# Patient Record
Sex: MALE | Race: WHITE | HISPANIC OR LATINO | Employment: FULL TIME | URBAN - METROPOLITAN AREA
[De-identification: names, ages, dates, MRNs, and addresses within clinical notes are randomized per-mention and may not be internally consistent; named-entity substitution may affect disease eponyms.]

---

## 2017-03-23 VITALS
HEIGHT: 70 IN | TEMPERATURE: 98.7 F | RESPIRATION RATE: 16 BRPM | SYSTOLIC BLOOD PRESSURE: 120 MMHG | WEIGHT: 210 LBS | HEART RATE: 82 BPM | BODY MASS INDEX: 30.06 KG/M2 | DIASTOLIC BLOOD PRESSURE: 72 MMHG

## 2017-03-23 RX ORDER — BUDESONIDE AND FORMOTEROL FUMARATE DIHYDRATE 80; 4.5 UG/1; UG/1
2 AEROSOL RESPIRATORY (INHALATION) 2 TIMES DAILY
COMMUNITY
End: 2017-05-26

## 2017-03-23 RX ORDER — ALBUTEROL SULFATE 90 UG/1
2 AEROSOL, METERED RESPIRATORY (INHALATION) EVERY 4 HOURS PRN
COMMUNITY
End: 2022-09-17

## 2017-03-23 RX ORDER — MONTELUKAST SODIUM 10 MG/1
10 TABLET ORAL DAILY
COMMUNITY
End: 2017-04-17 | Stop reason: SDUPTHER

## 2017-03-24 ENCOUNTER — OFFICE VISIT (OUTPATIENT)
Dept: PULMONOLOGY | Facility: HOSPICE | Age: 62
End: 2017-03-24
Payer: COMMERCIAL

## 2017-03-24 VITALS
DIASTOLIC BLOOD PRESSURE: 80 MMHG | RESPIRATION RATE: 16 BRPM | BODY MASS INDEX: 29.84 KG/M2 | WEIGHT: 208 LBS | TEMPERATURE: 97.3 F | SYSTOLIC BLOOD PRESSURE: 132 MMHG | HEART RATE: 76 BPM | OXYGEN SATURATION: 95 %

## 2017-03-24 DIAGNOSIS — R05.9 COUGH: ICD-10-CM

## 2017-03-24 DIAGNOSIS — J45.40 MODERATE PERSISTENT ASTHMA WITHOUT COMPLICATION: ICD-10-CM

## 2017-03-24 DIAGNOSIS — J30.2 SEASONAL ALLERGIC RHINITIS, UNSPECIFIED ALLERGIC RHINITIS TRIGGER: ICD-10-CM

## 2017-03-24 DIAGNOSIS — R13.10 DYSPHAGIA, UNSPECIFIED TYPE: ICD-10-CM

## 2017-03-24 PROCEDURE — 99214 OFFICE O/P EST MOD 30 MIN: CPT | Mod: 25 | Performed by: NURSE PRACTITIONER

## 2017-03-24 PROCEDURE — 94664 DEMO&/EVAL PT USE INHALER: CPT | Performed by: NURSE PRACTITIONER

## 2017-03-24 RX ORDER — GUAIFENESIN AND CODEINE PHOSPHATE 100; 10 MG/5ML; MG/5ML
SYRUP ORAL
COMMUNITY
Start: 2017-03-01 | End: 2017-03-03

## 2017-03-24 RX ORDER — GUAIFENESIN 600 MG/1
600 TABLET, EXTENDED RELEASE ORAL EVERY 12 HOURS
COMMUNITY
End: 2022-09-17

## 2017-03-24 RX ORDER — OMEPRAZOLE 20 MG/1
20 CAPSULE, DELAYED RELEASE ORAL
Qty: 30 CAP | Refills: 1 | Status: SHIPPED | OUTPATIENT
Start: 2017-03-24 | End: 2017-04-18 | Stop reason: SDUPTHER

## 2017-03-24 RX ORDER — PREDNISONE 10 MG/1
TABLET ORAL
Qty: 18 TAB | Refills: 0 | Status: SHIPPED | OUTPATIENT
Start: 2017-03-24 | End: 2019-03-05

## 2017-03-24 RX ORDER — ALBUTEROL SULFATE 2.5 MG/3ML
2.5 SOLUTION RESPIRATORY (INHALATION) EVERY 4 HOURS PRN
Qty: 180 ML | Refills: 1 | Status: SHIPPED | OUTPATIENT
Start: 2017-03-24 | End: 2018-07-02 | Stop reason: SDUPTHER

## 2017-03-24 RX ORDER — AZITHROMYCIN 250 MG/1
TABLET, FILM COATED ORAL
Qty: 6 TAB | Refills: 0 | Status: SHIPPED | OUTPATIENT
Start: 2017-03-24 | End: 2019-03-05

## 2017-03-24 RX ORDER — OSELTAMIVIR PHOSPHATE 75 MG/1
CAPSULE ORAL
COMMUNITY
Start: 2017-03-01 | End: 2017-03-30

## 2017-03-24 RX ORDER — ESZOPICLONE 3 MG/1
TABLET, FILM COATED ORAL
Refills: 5 | COMMUNITY
Start: 2017-02-28 | End: 2017-03-01

## 2017-03-24 NOTE — PATIENT INSTRUCTIONS
1. DC Qvar for now. Increase to Dulera 200/5, 2 puffs, twice a day, rinse mouth after use. Sample provided.  2. Rx for albuterol to pharmacy and home nebulizer to [facility]. Use albuterol via nebulizer every 4-6 hours, as needed, for cough or wheeze.  3. Rx for prednisone and Z-Natalio to pharmacy  4. Rx for omeprazole sent to pharmacy  5. Referral to GI  6. Follow-up in 2 months, sooner if needed

## 2017-03-24 NOTE — PROGRESS NOTES
Chief Complaint   Patient presents with   • Cough     x3 weeks, has tried OTC medications with no relief         HPI: This patient is a 61 y.o. male, who presents for annual follow-up of asthma/COPD. former 20 pack year smoking history quit in 1988. PFTs indicate an FEV1 of 2.81 L 76% predicted, FEV1 FVC ratio of 75, no significant bronchodilator response. He is compliant with Qvar 40 µg 2 puffs, twice a day, Singulair daily, albuterol HFA as needed. Over the past 3 weeks he's had worsening dyspnea, wheeze and productive cough. He tells me he always has a cough however this is again worse over the last 3 weeks. He was seen at urgent care in [place] treated with albuterol nebulizer and given Tamiflu. Cough is not resolved. He is requesting antibiotic today. Prior to becoming ill, his cough typically occurred while eating. He tells me he has difficulty swallowing. He will start to cough so violently at times that he vomits his meal. He has a history of reflux and was on omeprazole many years ago but discontinued this. He will experience occasional postnasal drip. Denies fever, chills, night sweats.    Past Medical History   Diagnosis Date   • Asthma        Social History   Substance Use Topics   • Smoking status: Former Smoker -- 1.00 packs/day for 20 years     Types: Cigarettes     Quit date: 01/01/1988   • Smokeless tobacco: None      Comment: Years unknown-please verify   • Alcohol Use: 0.6 - 1.8 oz/week     1-3 Standard drinks or equivalent per week      Comment: Hard liquor       Family History   Problem Relation Age of Onset   • Lung Cancer Father        Current medications as of today   Current Outpatient Prescriptions   Medication Sig Dispense Refill   • oseltamivir (TAMIFLU) 75 MG Cap      • guaifenesin LA (MUCINEX) 600 MG TABLET SR 12 HR Take 600 mg by mouth every 12 hours.     • beclomethasone (QVAR) 40 MCG/ACT inhaler Inhale 1 Puff by mouth 2 Times a Day.     • azithromycin (ZITHROMAX) 250 MG Tab Take 2  tablets on day 1, then take 1 tablet a day for 4 days. 6 Tab 0   • predniSONE (DELTASONE) 10 MG Tab Take 30mg x 3 days, then take 20mg x 3 days, then take 10mg x 3 days, with food, then discontinue. 18 Tab 0   • omeprazole (PRILOSEC) 20 MG delayed-release capsule Take 1 Cap by mouth every morning before breakfast. 30 minutes before meal. 30 Cap 1   • albuterol (PROVENTIL) 2.5mg/3ml Nebu Soln solution for nebulization 3 mL by Nebulization route every four hours as needed for Shortness of Breath. 180 mL 1   • montelukast (SINGULAIR) 10 MG Tab Take 10 mg by mouth every day.     • albuterol (PROAIR HFA) 108 (90 BASE) MCG/ACT Aero Soln inhalation aerosol Inhale 2 Puffs by mouth every four hours as needed for Shortness of Breath.     • budesonide-formoterol (SYMBICORT) 80-4.5 MCG/ACT Aerosol Inhale 2 Puffs by mouth 2 Times a Day. Rinse mouth after each use.       No current facility-administered medications for this visit.       Allergies: Review of patient's allergies indicates no known allergies.    Blood pressure 132/80, pulse 76, temperature 36.3 °C (97.3 °F), resp. rate 16, weight 94.348 kg (208 lb), SpO2 95 %.      ROS:   Constitutional: Denies fevers, chills, night sweats, weight loss or fatigue  HEENT: Denies earache, difficulty hearing, tinnitus, nasal congestion, hoarseness  Cardiovascular: Denies chest pain, tightness, palpitations, orthopnea or edema  Respiratory: See HPI  Sleep: Denies daytime sleepiness, snoring, apneas, insomnia, morning headaches  GI: Denies, nausea, abdominal pain, diarrhea or constipation. Positive for heartburn and dysphagia  : Denies frequent urination, hematuria, discharge or painful urination  Musculoskeletal: Denies back pain, painful joints, sore muscles  Neurological: Denies weakness or headaches  Skin: No rashes    Physical exam:   Appearance: Well-nourished, well-developed, in no acute distress  HEENT: Normocephalic, atraumatic, white sclera, PERRLA, oropharynx clear,  Mallampati 2  Respiratory: no intercostal retractions or accessory muscle use   Lungs auscultation: Clear to auscultation bilaterally  Cardiovascular: Regular rate rhythm no murmurs, rubs or gallops  Gait: Normal  Digits: No clubbing, cyanosis  Motor: No focal deficits  Orientation: Oriented to time, person and place    Diagnosis:  1. Moderate persistent asthma without complication  azithromycin (ZITHROMAX) 250 MG Tab    predniSONE (DELTASONE) 10 MG Tab    albuterol (PROVENTIL) 2.5mg/3ml Nebu Soln solution for nebulization    DME NEBULIZER   2. Cough  azithromycin (ZITHROMAX) 250 MG Tab    predniSONE (DELTASONE) 10 MG Tab    omeprazole (PRILOSEC) 20 MG delayed-release capsule    albuterol (PROVENTIL) 2.5mg/3ml Nebu Soln solution for nebulization    DME NEBULIZER    REFERRAL TO GASTROENTEROLOGY   3. Seasonal allergic rhinitis, unspecified allergic rhinitis trigger     4. Dysphagia, unspecified type  REFERRAL TO GASTROENTEROLOGY       Plan:  I will treat bronchitis and asthma exacerbation with prednisone, antibiotic and increased bronchodilators. Patient always has a dry cough which he describes being worse after eating. I will restart him on omeprazole as he does experience periodic GERD symptoms. Follow up with GI for possible endoscopy. Pending their workup, consider update chest CT and PFTs.    1. DC Qvar for now. Increase to Dulera 200/5, 2 puffs, twice a day, rinse mouth after use. Sample provided.  2. Rx for albuterol to pharmacy and home nebulizer to [facility]. Use albuterol via nebulizer every 4-6 hours, as needed, for cough or wheeze.  3. Rx for prednisone and Z-Natalio to pharmacy  4. Rx for omeprazole sent to pharmacy  5. Referral to GI for dysphagia and cough after eating  6. Follow-up in 2 months, sooner if needed

## 2017-03-24 NOTE — MR AVS SNAPSHOT
Stew Ramos   3/24/2017 1:00 PM   Office Visit   MRN: [id number]    Department:  [facility]   Dept Phone:  [phone]    Description:  Male : 1955   Provider:  USMAN Riley           Reason for Visit     Cough x3 weeks, has tried OTC medications with no relief      Allergies as of 3/24/2017     No Known Allergies      You were diagnosed with     Moderate persistent asthma without complication   [791180]       Cough   [786.2.ICD-9-CM]       Seasonal allergic rhinitis, unspecified allergic rhinitis trigger   [4924426]       Dysphagia, unspecified type   [3561521]         Vital Signs     Blood Pressure Pulse Temperature Respirations Weight Oxygen Saturation    132/80 mmHg 76 36.3 °C (97.3 °F) 16 94.348 kg (208 lb) 95%    Smoking Status                   Former Smoker           Basic Information     Date Of Birth Sex Race Ethnicity Preferred Language    1955 Male Unable to Obtain Unknown English      Your appointments     May 26, 2017  1:00 PM   Established Patient Pul with USMAN Riley   [facility] (--)    [address] [facility] [address]  [facility] [id number] [phone]              Problem List              ICD-10-CM Priority Class Noted - Resolved    Moderate persistent asthma without complication J45.40   3/24/2017 - Present    Cough R05   3/24/2017 - Present    Seasonal allergies J30.2   3/24/2017 - Present      Health Maintenance        Date Due Completion Dates    IMM DTaP/Tdap/Td Vaccine (1 - Tdap) 4/3/1975 ---    COLONOSCOPY 4/3/2006 ---    IMM ZOSTER VACCINE 4/3/2016 ---    IMM INFLUENZA (1) 2016 ---            Current Immunizations     No immunizations on file.      Below and/or attached are the medications your provider expects you to take. Review all of your home medications and newly ordered medications with your provider and/or pharmacist. Follow medication instructions as directed by your provider and/or pharmacist.  Please keep your medication list with you and share with your provider. Update the information when medications are discontinued, doses are changed, or new medications (including over-the-counter products) are added; and carry medication information at all times in the event of emergency situations     Allergies:  No Known Allergies          Medications  Valid as of: March 24, 2017 -  1:45 PM    Generic Name Brand Name Tablet Size Instructions for use    Albuterol Sulfate (Aero Soln) albuterol 108 (90 BASE) MCG/ACT Inhale 2 Puffs by mouth every four hours as needed for Shortness of Breath.        Albuterol Sulfate (Nebu Soln) PROVENTIL 2.5mg/3ml 3 mL by Nebulization route every four hours as needed for Shortness of Breath.        Azithromycin (Tab) ZITHROMAX 250 MG Take 2 tablets on day 1, then take 1 tablet a day for 4 days.        Beclomethasone Dipropionate (Aero Soln) QVAR 40 MCG/ACT Inhale 1 Puff by mouth 2 Times a Day.        Budesonide-Formoterol Fumarate (Aerosol) SYMBICORT 80-4.5 MCG/ACT Inhale 2 Puffs by mouth 2 Times a Day. Rinse mouth after each use.        GuaiFENesin (TABLET SR 12 HR) MUCINEX 600 MG Take 600 mg by mouth every 12 hours.        Montelukast Sodium (Tab) SINGULAIR 10 MG Take 10 mg by mouth every day.        Omeprazole (CAPSULE DELAYED RELEASE) PRILOSEC 20 MG Take 1 Cap by mouth every morning before breakfast. 30 minutes before meal.        Oseltamivir Phosphate (Cap) TAMIFLU 75 MG         PredniSONE (Tab) DELTASONE 10 MG Take 30mg x 3 days, then take 20mg x 3 days, then take 10mg x 3 days, with food, then discontinue.        .                 Medicines prescribed today were sent to:     [id number] - [place] - [address] [facility]    [address] [facility] [place] [address]    Phone: [phone] Fax: [phone]    Open 24 Hours?: No    [facility] - [address] [facility] [address]    Phone: [phone] Fax: [phone]     Open 24 Hours?: No      Medication refill instructions:       If your prescription bottle indicates you have medication refills left, it is not necessary to call your provider’s office. Please contact your pharmacy and they will refill your medication.    If your prescription bottle indicates you do not have any refills left, you may request refills at any time through one of the following ways: The online [employer] system (except Urgent Care), by calling your provider’s office, or by asking your pharmacy to contact your provider’s office with a refill request. Medication refills are processed only during regular business hours and may not be available until the next business day. Your provider may request additional information or to have a follow-up visit with you prior to refilling your medication.   *Please Note: Medication refills are assigned a new Rx number when refilled electronically. Your pharmacy may indicate that no refills were authorized even though a new prescription for the same medication is available at the pharmacy. Please request the medicine by name with the pharmacy before contacting your provider for a refill.        Referral     A referral request has been sent to our patient care coordination department. Please allow 3-5 business days for us to process this request and contact you either by phone or mail. If you do not hear from us by the 5th business day, please call us at [phone].        Instructions    1. DC Qvar for now. Increase to Dulera 200/5, 2 puffs, twice a day, rinse mouth after use. Sample provided.  2. Rx for albuterol to pharmacy and home nebulizer to [facility]. Use albuterol via nebulizer every 4-6 hours, as needed, for cough or wheeze.  3. Rx for prednisone and Z-Natalio to pharmacy  4. Rx for omeprazole sent to pharmacy  5. Referral to GI  6. Follow-up in 2 months, sooner if needed          [facility] Status: Patient Declined

## 2017-04-14 ENCOUNTER — HOSPITAL ENCOUNTER (OUTPATIENT)
Dept: RADIOLOGY | Facility: MEDICAL CENTER | Age: 62
End: 2017-04-14
Attending: PHYSICIAN ASSISTANT
Payer: COMMERCIAL

## 2017-04-14 DIAGNOSIS — R05.9 COUGH: ICD-10-CM

## 2017-04-14 DIAGNOSIS — K57.92 DIVERTICULITIS OF INTESTINE WITHOUT PERFORATION OR ABSCESS WITHOUT BLEEDING, UNSPECIFIED PART OF INTESTINAL TRACT: ICD-10-CM

## 2017-04-14 PROCEDURE — 74220 X-RAY XM ESOPHAGUS 1CNTRST: CPT

## 2017-04-17 DIAGNOSIS — J45.909 UNCOMPLICATED ASTHMA, UNSPECIFIED ASTHMA SEVERITY: ICD-10-CM

## 2017-04-17 RX ORDER — MONTELUKAST SODIUM 10 MG/1
TABLET ORAL
Qty: 90 TAB | Refills: 3 | Status: SHIPPED | OUTPATIENT
Start: 2017-04-17 | End: 2022-09-17

## 2017-04-17 NOTE — TELEPHONE ENCOUNTER
Have we ever prescribed this med? Yes.  If yes, what date?     Last OV: 03/24/2017 - Aarti Cole    Next OV: 05/26/2017 - Aarti Cole    DX: ASTHMA    Medications: SINGULAIR

## 2017-04-18 DIAGNOSIS — R05.9 COUGH: ICD-10-CM

## 2017-04-18 RX ORDER — OMEPRAZOLE 20 MG/1
20 CAPSULE, DELAYED RELEASE ORAL
Qty: 30 CAP | Refills: 5 | Status: SHIPPED | OUTPATIENT
Start: 2017-04-18 | End: 2017-09-13 | Stop reason: SDUPTHER

## 2017-04-18 NOTE — TELEPHONE ENCOUNTER
Caller Name: Stew Ramos                 Call Back Number: [phone] (home)         Patient approves a detailed voicemail message: yes    Have we ever prescribed this med? Yes.  If yes, what date? 3/24/17    Last OV: 3/24/17    Next OV: 5/26/17    DX: Cough    Medications:  Current Outpatient Prescriptions   Medication Sig Dispense Refill   • montelukast (SINGULAIR) 10 MG Tab Take 1 tablet by mouth  every night at bedtime 90 Tab 3   • guaifenesin LA (MUCINEX) 600 MG TABLET SR 12 HR Take 600 mg by mouth every 12 hours.     • beclomethasone (QVAR) 40 MCG/ACT inhaler Inhale 1 Puff by mouth 2 Times a Day.     • azithromycin (ZITHROMAX) 250 MG Tab Take 2 tablets on day 1, then take 1 tablet a day for 4 days. 6 Tab 0   • predniSONE (DELTASONE) 10 MG Tab Take 30mg x 3 days, then take 20mg x 3 days, then take 10mg x 3 days, with food, then discontinue. 18 Tab 0   • omeprazole (PRILOSEC) 20 MG delayed-release capsule Take 1 Cap by mouth every morning before breakfast. 30 minutes before meal. 30 Cap 1   • albuterol (PROVENTIL) 2.5mg/3ml Nebu Soln solution for nebulization 3 mL by Nebulization route every four hours as needed for Shortness of Breath. 180 mL 1   • albuterol (PROAIR HFA) 108 (90 BASE) MCG/ACT Aero Soln inhalation aerosol Inhale 2 Puffs by mouth every four hours as needed for Shortness of Breath.     • budesonide-formoterol (SYMBICORT) 80-4.5 MCG/ACT Aerosol Inhale 2 Puffs by mouth 2 Times a Day. Rinse mouth after each use.       No current facility-administered medications for this visit.

## 2017-05-26 ENCOUNTER — APPOINTMENT (OUTPATIENT)
Dept: RADIOLOGY | Facility: IMAGING CENTER | Age: 62
End: 2017-05-26
Attending: NURSE PRACTITIONER
Payer: COMMERCIAL

## 2017-05-26 ENCOUNTER — OFFICE VISIT (OUTPATIENT)
Dept: PULMONOLOGY | Facility: HOSPICE | Age: 62
End: 2017-05-26
Payer: COMMERCIAL

## 2017-05-26 VITALS
BODY MASS INDEX: 28.63 KG/M2 | HEART RATE: 99 BPM | OXYGEN SATURATION: 92 % | RESPIRATION RATE: 15 BRPM | SYSTOLIC BLOOD PRESSURE: 120 MMHG | HEIGHT: 70 IN | WEIGHT: 200 LBS | DIASTOLIC BLOOD PRESSURE: 64 MMHG

## 2017-05-26 DIAGNOSIS — K21.9 GASTROESOPHAGEAL REFLUX DISEASE, ESOPHAGITIS PRESENCE NOT SPECIFIED: ICD-10-CM

## 2017-05-26 DIAGNOSIS — J30.2 SEASONAL ALLERGIC RHINITIS, UNSPECIFIED ALLERGIC RHINITIS TRIGGER: ICD-10-CM

## 2017-05-26 DIAGNOSIS — Z87.891 FORMER SMOKER: ICD-10-CM

## 2017-05-26 DIAGNOSIS — J45.40 MODERATE PERSISTENT ASTHMA WITHOUT COMPLICATION: ICD-10-CM

## 2017-05-26 PROBLEM — R05.9 COUGH: Status: RESOLVED | Noted: 2017-03-24 | Resolved: 2017-05-26

## 2017-05-26 PROCEDURE — 99214 OFFICE O/P EST MOD 30 MIN: CPT | Performed by: NURSE PRACTITIONER

## 2017-05-26 PROCEDURE — 71020 DX-CHEST-2 VIEWS: CPT | Mod: TC | Performed by: NURSE PRACTITIONER

## 2017-05-26 NOTE — PATIENT INSTRUCTIONS
1. Continue Dulera 200/5 mcg, 2 puffs, twice a day. Rinse mouth after use. Rx to pharmacy for 90 day supply.  2. Follow-up with GI as scheduled  3. Chest x-ray today, will call if abnormal results  4. Influenza vaccine recommended in the fall  5. Follow-up in 6-12 months, sooner if cough returns  6. Continue omeprazole daily

## 2017-05-26 NOTE — PROGRESS NOTES
Chief Complaint   Patient presents with   • Follow-Up     2 month          HPI: This patient is a 62 y.o. male, who presents for follow-up cough. History of moderate asthma, GERD & seasonal allergies. He was treated with prednisone, Z-Natalio, and bronchodilator therapy was changed to Dulera 200/5 µg at his last visit. Omeprazole was also added for reflux symptoms. Patient was seen by GI, barium swallow testing indicated Small hiatal hernia present. There is a B ring present with a diameter of 21 mm. This does not delay transit of a barium tablet. Prominent gastroesophageal reflux. Occasional secondary and tertiary esophageal waves consistent with dysmotility. He has follow-up scheduled with GI to review. He is a former smoker, 20 pack year history quit in 1988. PFTs indicate an FEV1 of 2.81 L 76% predicted, FEV1 FVC ratio of 75. He takes Singulair for allergies. He tells me with treatment prescribed at last visit his cough has virtually resolved. His allergies are not troublesome. Denies dyspnea or wheeze. No other changes to his health. He is feeling very well and ran [place] to [employer] in [place] this past weekend.    Past Medical History   Diagnosis Date   • Asthma        Social History   Substance Use Topics   • Smoking status: Former Smoker -- 1.00 packs/day for 20 years     Types: Cigarettes     Quit date: 01/01/1988   • Smokeless tobacco: None      Comment: Years unknown-please verify   • Alcohol Use: 0.6 - 1.8 oz/week     1-3 Standard drinks or equivalent per week      Comment: Hard liquor       Family History   Problem Relation Age of Onset   • Lung Cancer Father        Current medications as of today   Current Outpatient Prescriptions   Medication Sig Dispense Refill   • Mometasone Furo-Formoterol Fum (DULERA) 200-5 MCG/ACT Aerosol Inhale 2 Puffs by mouth 2 Times a Day. Use spacer. Rinse mouth after use. 3 Inhaler 3   • omeprazole (PRILOSEC) 20 MG delayed-release capsule Take 1 Cap by mouth every morning  "before breakfast. 30 minutes before meal. 30 Cap 5   • montelukast (SINGULAIR) 10 MG Tab Take 1 tablet by mouth  every night at bedtime 90 Tab 3   • albuterol (PROVENTIL) 2.5mg/3ml Nebu Soln solution for nebulization 3 mL by Nebulization route every four hours as needed for Shortness of Breath. 180 mL 1   • guaifenesin LA (MUCINEX) 600 MG TABLET SR 12 HR Take 600 mg by mouth every 12 hours.     • azithromycin (ZITHROMAX) 250 MG Tab Take 2 tablets on day 1, then take 1 tablet a day for 4 days. 6 Tab 0   • predniSONE (DELTASONE) 10 MG Tab Take 30mg x 3 days, then take 20mg x 3 days, then take 10mg x 3 days, with food, then discontinue. 18 Tab 0   • albuterol (PROAIR HFA) 108 (90 BASE) MCG/ACT Aero Soln inhalation aerosol Inhale 2 Puffs by mouth every four hours as needed for Shortness of Breath.       No current facility-administered medications for this visit.       Allergies: Review of patient's allergies indicates no known allergies.    Blood pressure 120/64, pulse 99, resp. rate 15, height 1.778 m (5' 10\"), weight 90.719 kg (200 lb), SpO2 92 %.      ROS:   Constitutional: Denies fevers, chills, night sweats, weight loss or fatigue  HEENT: Denies earache, difficulty hearing, tinnitus, nasal congestion, hoarseness  Cardiovascular: Denies chest pain, tightness, palpitations, orthopnea or edema  Respiratory: See HPI  Sleep: Denies daytime sleepiness, snoring, apneas, insomnia, morning headaches  GI: Denies heartburn, dysphagia, nausea, abdominal pain, diarrhea or constipation  : Denies frequent urination, hematuria, discharge or painful urination  Musculoskeletal: Denies back pain, painful joints, sore muscles  Neurological: Denies weakness or headaches  Skin: No rashes    Physical exam:   Appearance: Well-nourished, well-developed, in no acute distress  HEENT: Normocephalic, atraumatic, white sclera, PERRLA, oropharynx clear  Respiratory: no intercostal retractions or accessory muscle use   Lungs auscultation: " Clear to auscultation bilaterally  Cardiovascular: Regular rate rhythm no murmurs, rubs or gallops  Gait: Normal  Digits: No clubbing, cyanosis  Motor: No focal deficits  Orientation: Oriented to time, person and place    Diagnosis:  1. Moderate persistent asthma without complication  DX-CHEST-2 VIEWS    Mometasone Furo-Formoterol Fum (DULERA) 200-5 MCG/ACT Aerosol   2. Gastroesophageal reflux disease, esophagitis presence not specified     3. Seasonal allergic rhinitis, unspecified allergic rhinitis trigger     4. Former smoker         Plan:  Given patient's smoking history, chest x-ray recommended and completed in the office today. Cough is virtually resolved. He will maintain on current regimen and follow-up with GI for barium swallow eval results.    1. Continue Dulera 200/5 mcg, 2 puffs, twice a day. Rinse mouth after use. Rx to pharmacy for 90 day supply.  2. Follow-up with GI as scheduled  3. Chest x-ray today, will call if abnormal results  4. Influenza vaccine recommended in the fall  5. Follow-up in 6-12 months, sooner if symptoms return  6. Continue omeprazole daily      Chest x-ray today is unremarkable

## 2017-05-26 NOTE — MR AVS SNAPSHOT
"        Stew Ramos   2017 1:00 PM   Office Visit   MRN: [id number]    Department:  [facility]   Dept Phone:  [phone]    Description:  Male : 1955   Provider:  UMSAN Riley           Reason for Visit     Follow-Up 2 month       Allergies as of 2017     No Known Allergies      You were diagnosed with     Moderate persistent asthma without complication   [709741]       Gastroesophageal reflux disease, esophagitis presence not specified   [1686610]       Seasonal allergic rhinitis, unspecified allergic rhinitis trigger   [6028356]         Vital Signs     Blood Pressure Pulse Respirations Height Weight Body Mass Index    120/64 mmHg 99 15 1.778 m (5' 10\") 90.719 kg (200 lb) 28.70 kg/m2    Oxygen Saturation Smoking Status                92% Former Smoker          Basic Information     Date Of Birth Sex Race Ethnicity Preferred Language    1955 Male White  Origin (Colombian,Mozambican,English,Singaporean, etc) English      Problem List              ICD-10-CM Priority Class Noted - Resolved    Moderate persistent asthma without complication J45.40   3/24/2017 - Present    Seasonal allergies J30.2   3/24/2017 - Present    GERD (gastroesophageal reflux disease) K21.9   2017 - Present      Health Maintenance        Date Due Completion Dates    IMM DTaP/Tdap/Td Vaccine (1 - Tdap) 4/3/1975 ---    IMM PNEUMOCOCCAL 19-64 (ADULT) MEDIUM RISK SERIES (1 of 1 - PPSV23) 4/3/1975 ---    COLONOSCOPY 4/3/2006 ---    IMM ZOSTER VACCINE 4/3/2016 ---            Current Immunizations     No immunizations on file.      Below and/or attached are the medications your provider expects you to take. Review all of your home medications and newly ordered medications with your provider and/or pharmacist. Follow medication instructions as directed by your provider and/or pharmacist. Please keep your medication list with you and share with your provider. Update the information when medications " are discontinued, doses are changed, or new medications (including over-the-counter products) are added; and carry medication information at all times in the event of emergency situations     Allergies:  No Known Allergies          Medications  Valid as of: May 26, 2017 -  1:31 PM    Generic Name Brand Name Tablet Size Instructions for use    Albuterol Sulfate (Aero Soln) albuterol 108 (90 BASE) MCG/ACT Inhale 2 Puffs by mouth every four hours as needed for Shortness of Breath.        Albuterol Sulfate (Nebu Soln) PROVENTIL 2.5mg/3ml 3 mL by Nebulization route every four hours as needed for Shortness of Breath.        Azithromycin (Tab) ZITHROMAX 250 MG Take 2 tablets on day 1, then take 1 tablet a day for 4 days.        Beclomethasone Dipropionate (Aero Soln) QVAR 40 MCG/ACT Inhale 1 Puff by mouth 2 Times a Day.        Budesonide-Formoterol Fumarate (Aerosol) SYMBICORT 80-4.5 MCG/ACT Inhale 2 Puffs by mouth 2 Times a Day. Rinse mouth after each use.        GuaiFENesin (TABLET SR 12 HR) MUCINEX 600 MG Take 600 mg by mouth every 12 hours.        Montelukast Sodium (Tab) SINGULAIR 10 MG Take 1 tablet by mouth  every night at bedtime        Omeprazole (CAPSULE DELAYED RELEASE) PRILOSEC 20 MG Take 1 Cap by mouth every morning before breakfast. 30 minutes before meal.        PredniSONE (Tab) DELTASONE 10 MG Take 30mg x 3 days, then take 20mg x 3 days, then take 10mg x 3 days, with food, then discontinue.        .                 Medicines prescribed today were sent to:     [id number] - [place] - [id number] [id number] [facility] [address]    Phone: [phone] Fax: [phone]    Open 24 Hours?: No    [facility] - [place], [place] - [address] [place] [address] [address] [facility] [address]    Phone: [phone] Fax: [phone]    Open 24 Hours?: No      Medication refill instructions:       If your prescription bottle indicates you have medication refills left, it is not  necessary to call your provider’s office. Please contact your pharmacy and they will refill your medication.    If your prescription bottle indicates you do not have any refills left, you may request refills at any time through one of the following ways: The online [employer] system (except Urgent Care), by calling your provider’s office, or by asking your pharmacy to contact your provider’s office with a refill request. Medication refills are processed only during regular business hours and may not be available until the next business day. Your provider may request additional information or to have a follow-up visit with you prior to refilling your medication.   *Please Note: Medication refills are assigned a new Rx number when refilled electronically. Your pharmacy may indicate that no refills were authorized even though a new prescription for the same medication is available at the pharmacy. Please request the medicine by name with the pharmacy before contacting your provider for a refill.           [employer] Status: Patient Declined

## 2017-06-01 ENCOUNTER — TELEPHONE (OUTPATIENT)
Dept: PULMONOLOGY | Facility: HOSPICE | Age: 62
End: 2017-06-01

## 2017-06-01 DIAGNOSIS — J45.30 MILD PERSISTENT ASTHMA WITHOUT COMPLICATION: ICD-10-CM

## 2017-06-01 NOTE — TELEPHONE ENCOUNTER
DOCUMENTATION OF PRIOR AUTH STATUS    1. Medication name and dose: Dulera 200/5 mcg    2. Name and Phone # of Prescription coverage company: [employer] [phone]    3. Date Prior Auth was submitted: 6/1/2017    4. What information was given to obtain insurance decision: Clinical notes    5. Prior Auth letter Approved or Denied: Denied    6. Pharmacy notified: No    7. Patient notified: No        Dulera 200/5 mcg was denied.  The pt must first try Advair or Breo Ellipta.  Dx is Asthma.  Please advise, thank you.

## 2017-06-01 NOTE — TELEPHONE ENCOUNTER
MEDICATION PRIOR AUTHORIZATION NEEDED:    1. Name of Medication: Dulera 200/5 mcg    2. Requested By (Name of Pharmacy): [facility]     3. Is insurance on file current? yes    4. What is the name & phone number of the 3rd party payor? [facility] [phone]

## 2017-06-02 NOTE — TELEPHONE ENCOUNTER
Called and let the pt know that we sent Breo to his pharmarcy to replace the Dulera that was denied by his insurance.

## 2017-06-30 ENCOUNTER — NON-PROVIDER VISIT (OUTPATIENT)
Dept: URGENT CARE | Facility: CLINIC | Age: 62
End: 2017-06-30
Payer: COMMERCIAL

## 2017-06-30 DIAGNOSIS — Z11.1 PPD SCREENING TEST: ICD-10-CM

## 2017-06-30 PROCEDURE — 86580 TB INTRADERMAL TEST: CPT | Performed by: NURSE PRACTITIONER

## 2017-07-02 ENCOUNTER — NON-PROVIDER VISIT (OUTPATIENT)
Dept: URGENT CARE | Facility: CLINIC | Age: 62
End: 2017-07-02
Payer: COMMERCIAL

## 2017-07-02 LAB — TB WHEAL 3D P 5 TU DIAM: 0 MM

## 2017-12-02 DIAGNOSIS — R05.9 COUGH: ICD-10-CM

## 2018-07-02 DIAGNOSIS — R05.9 COUGH: ICD-10-CM

## 2018-07-02 DIAGNOSIS — J45.40 MODERATE PERSISTENT ASTHMA WITHOUT COMPLICATION: ICD-10-CM

## 2018-07-02 RX ORDER — ALBUTEROL SULFATE 2.5 MG/3ML
2.5 SOLUTION RESPIRATORY (INHALATION) EVERY 4 HOURS PRN
Qty: 180 ML | Refills: 3 | Status: SHIPPED | OUTPATIENT
Start: 2018-07-02 | End: 2018-07-02 | Stop reason: SDUPTHER

## 2018-07-02 RX ORDER — ALBUTEROL SULFATE 2.5 MG/3ML
2.5 SOLUTION RESPIRATORY (INHALATION) EVERY 4 HOURS PRN
Qty: 180 ML | Refills: 3 | Status: SHIPPED | OUTPATIENT
Start: 2018-07-02 | End: 2022-09-17

## 2018-07-02 NOTE — TELEPHONE ENCOUNTER
Have we ever prescribed this med? Yes.  If yes, what date? 03/24/17    Last OV: 05/26/17-Bick    Next OV: 08/24/18-Brown    DX: Asthma     Medications:   Requested Prescriptions     Pending Prescriptions Disp Refills   • albuterol (PROVENTIL) 2.5mg/3ml Nebu Soln solution for nebulization 180 mL 0     Sig: 3 mL by Nebulization route every four hours as needed for Shortness of Breath.

## 2018-07-03 ENCOUNTER — OFFICE VISIT (OUTPATIENT)
Dept: PULMONOLOGY | Facility: HOSPICE | Age: 63
End: 2018-07-03
Payer: COMMERCIAL

## 2018-07-03 VITALS
OXYGEN SATURATION: 95 % | SYSTOLIC BLOOD PRESSURE: 126 MMHG | TEMPERATURE: 98.1 F | BODY MASS INDEX: 28.63 KG/M2 | RESPIRATION RATE: 16 BRPM | HEIGHT: 70 IN | HEART RATE: 87 BPM | WEIGHT: 200 LBS | DIASTOLIC BLOOD PRESSURE: 82 MMHG

## 2018-07-03 DIAGNOSIS — Z23 NEED FOR VACCINATION: ICD-10-CM

## 2018-07-03 DIAGNOSIS — Z87.891 FORMER SMOKER: ICD-10-CM

## 2018-07-03 DIAGNOSIS — J45.40 MODERATE PERSISTENT ASTHMA WITHOUT COMPLICATION: ICD-10-CM

## 2018-07-03 DIAGNOSIS — R05.9 COUGH: ICD-10-CM

## 2018-07-03 DIAGNOSIS — J30.2 SEASONAL ALLERGIC RHINITIS, UNSPECIFIED TRIGGER: ICD-10-CM

## 2018-07-03 PROCEDURE — 90471 IMMUNIZATION ADMIN: CPT | Performed by: NURSE PRACTITIONER

## 2018-07-03 PROCEDURE — 90732 PPSV23 VACC 2 YRS+ SUBQ/IM: CPT | Performed by: NURSE PRACTITIONER

## 2018-07-03 PROCEDURE — 99214 OFFICE O/P EST MOD 30 MIN: CPT | Mod: 25 | Performed by: NURSE PRACTITIONER

## 2018-07-03 RX ORDER — CLINDAMYCIN HYDROCHLORIDE 300 MG/1
CAPSULE ORAL
Refills: 0 | COMMUNITY
Start: 2018-06-06 | End: 2019-04-19

## 2018-07-03 RX ORDER — BROMPHENIRAMINE MALEATE, PSEUDOEPHEDRINE HYDROCHLORIDE, AND DEXTROMETHORPHAN HYDROBROMIDE 2; 30; 10 MG/5ML; MG/5ML; MG/5ML
SYRUP ORAL
Refills: 0 | COMMUNITY
Start: 2018-06-06 | End: 2022-09-17

## 2018-07-03 RX ORDER — PREDNISONE 20 MG/1
TABLET ORAL
Refills: 0 | COMMUNITY
Start: 2018-06-06 | End: 2019-04-19

## 2018-07-03 ASSESSMENT — ENCOUNTER SYMPTOMS
STRIDOR: 0
CARDIOVASCULAR NEGATIVE: 1
EYE PAIN: 0
CONSTITUTIONAL NEGATIVE: 1
MUSCULOSKELETAL NEGATIVE: 1
SINUS PAIN: 0
GASTROINTESTINAL NEGATIVE: 1
SHORTNESS OF BREATH: 0
EYE REDNESS: 1
COUGH: 1
NEUROLOGICAL NEGATIVE: 1
BRUISES/BLEEDS EASILY: 0
WHEEZING: 0
EYE DISCHARGE: 0
SPUTUM PRODUCTION: 0
PSYCHIATRIC NEGATIVE: 1
HEMOPTYSIS: 0
SORE THROAT: 0

## 2018-07-04 NOTE — PROGRESS NOTES
Chief Complaint   Patient presents with   • Follow-Up         HPI: This patient is a 63 y.o. male, who presents for annual follow-up of asthma and seasonal allergies.    Patient is a former smoker 20 pack year history quit in 1988.    PFTs show an FEV1 of 2.81 L 76% predicted, FEV1 FVC ratio of 75.  He is compliant with Dulera once a day.  And rare use of albuterol.    He has seasonal allergies for which she takes Singulair with some improvement.  He continues to report itchy watery eyes, mild postnasal drip.    He reports a chronic intermittent cough.  Cough will come and go typically last 3 months at a time.  He received a Kenalog injection 1 month ago with no subjective benefit.  Cough is typically worse in summer and fall.  He says the cough does resolve.  He denies infectious symptoms, fever, chills, night sweats, chest pain or pressure.  He denies swelling in his lower extremities.  Denies hemoptysis.  He denies reflux, his symptoms are controlled with omeprazole.  He does have a history of small hiatal hernia.    Past Medical History:   Diagnosis Date   • Asthma        Social History   Substance Use Topics   • Smoking status: Former Smoker     Packs/day: 1.00     Years: 20.00     Types: Cigarettes     Quit date: 1/1/1988   • Smokeless tobacco: Never Used      Comment: Years unknown-please verify   • Alcohol use 0.6 - 1.8 oz/week     1 - 3 Standard drinks or equivalent per week      Comment: Hard liquor       Family History   Problem Relation Age of Onset   • Lung Cancer Father        Immunization History   Administered Date(s) Administered   • Influenza Seasonal Injectable 09/18/2015   • Influenza Vaccine H1N1 10/12/2009   • Influenza Vaccine Quad Inj (Pf) 12/22/2017   • Tdap Vaccine 10/28/2017   • Tuberculin Skin Test 06/30/2017   • Zoster Vaccine Live (ZVL) (Zostavax) 09/18/2015       Current medications as of today   Current Outpatient Prescriptions   Medication Sig Dispense Refill   • Mometasone  "Furo-Formoterol Fum (DULERA) 100-5 MCG/ACT Aerosol Inhale 2 Inhalation by mouth 2 Times a Day. Rinse mouth after use 3 Inhaler 3   • albuterol (PROVENTIL) 2.5mg/3ml Nebu Soln solution for nebulization 3 mL by Nebulization route every four hours as needed for Shortness of Breath. 180 mL 3   • omeprazole (PRILOSEC) 20 MG delayed-release capsule Take 1 capsule by mouth  every morning 30 minutes  before breakfast 90 Cap 3   • albuterol (PROAIR HFA) 108 (90 BASE) MCG/ACT Aero Soln inhalation aerosol Inhale 2 Puffs by mouth every four hours as needed for Shortness of Breath.     • brompheniramine-pseudoephedrine-DM 30-2-10 MG/5ML syrup TAKE 10 ML (2 TEASPOONFUL) BY MOUTH THREE TIMES A DAY FOR 5 DAYS  0   • predniSONE (DELTASONE) 20 MG Tab TAKE 3 TABLETS BY MOUTH EVERY DAY FOR 2 DAYS THEN 2 TABLETS DAILY FOR 3 DAYS *TAKE WITH FOOD*  0   • clindamycin (CLEOCIN) 300 MG Cap TAKE ONE CAPSULE BY MOUTH EVERY 8 HOURS FOR 10 DAYS  0   • montelukast (SINGULAIR) 10 MG Tab Take 1 tablet by mouth  every night at bedtime 90 Tab 3   • guaifenesin LA (MUCINEX) 600 MG TABLET SR 12 HR Take 600 mg by mouth every 12 hours.     • azithromycin (ZITHROMAX) 250 MG Tab Take 2 tablets on day 1, then take 1 tablet a day for 4 days. 6 Tab 0   • predniSONE (DELTASONE) 10 MG Tab Take 30mg x 3 days, then take 20mg x 3 days, then take 10mg x 3 days, with food, then discontinue. 18 Tab 0     No current facility-administered medications for this visit.        Allergies: Patient has no known allergies.    Blood pressure 126/82, pulse 87, temperature 36.7 °C (98.1 °F), resp. rate 16, height 1.778 m (5' 10\"), weight 90.7 kg (200 lb), SpO2 95 %.      Review of Systems   Constitutional: Negative.    HENT: Positive for congestion. Negative for ear discharge, ear pain, hearing loss, nosebleeds, sinus pain, sore throat and tinnitus.    Eyes: Positive for redness. Negative for pain and discharge.   Respiratory: Positive for cough. Negative for hemoptysis, sputum " production, shortness of breath, wheezing and stridor.    Cardiovascular: Negative.    Gastrointestinal: Negative.    Musculoskeletal: Negative.    Skin: Negative.    Neurological: Negative.    Endo/Heme/Allergies: Negative for environmental allergies. Does not bruise/bleed easily.   Psychiatric/Behavioral: Negative.        Physical Exam   Constitutional: He is oriented to person, place, and time and well-developed, well-nourished, and in no distress.   HENT:   Head: Normocephalic and atraumatic.   Eyes: Pupils are equal, round, and reactive to light.   Neck: Normal range of motion. Neck supple. No tracheal deviation present.   Cardiovascular: Normal rate, regular rhythm, normal heart sounds and intact distal pulses.    Pulmonary/Chest: Effort normal and breath sounds normal.   Musculoskeletal: Normal range of motion.   Neurological: He is alert and oriented to person, place, and time. Gait normal.   Skin: Skin is warm and dry.   Psychiatric: Mood, memory, affect and judgment normal.       Diagnoses/Plan:    1. Moderate persistent asthma without complication  Continue current bronchodilators.  I encouraged him to use his Dulera twice a day, I suspect this will help with cough.  Cough is likely related to allergies and asthma  - Mometasone Furo-Formoterol Fum (DULERA) 100-5 MCG/ACT Aerosol; Inhale 2 Inhalation by mouth 2 Times a Day. Rinse mouth after use  Dispense: 3 Inhaler; Refill: 3    2. Need for vaccination  - PNEUMOCOCCAL POLYSACCHARIDE VACCINE 23-VALENT =>1YO SQ/IM    3. Cough  Patient does have a smoking history, will obtain chest x-ray to rule out pulmonary parenchymal changes  - DX-CHEST-2 VIEWS    4. Seasonal allergic rhinitis, unspecified trigger  Continue daily Singulair, add OTC antihistamine.  Consider Flonase for postnasal drip.    5. Former smoker  Permanent and complete smoking cessation is strongly encouraged    He will follow-up annually, sooner for unresolving cough.  I encouraged him to  follow-up with his PCP for routine health maintenance.

## 2018-07-05 ENCOUNTER — TELEPHONE (OUTPATIENT)
Dept: PULMONOLOGY | Facility: HOSPICE | Age: 63
End: 2018-07-05

## 2018-07-05 NOTE — TELEPHONE ENCOUNTER
MEDICATION PRIOR AUTHORIZATION NEEDED:    1. Name of Medication: Dulera 100-5 mcg    2. Requested By (Name of Pharmacy): [facility]     3. Is insurance on file current? yes    4. What is the name & phone number of the 3rd party payor? [facility] [phone]

## 2018-07-05 NOTE — TELEPHONE ENCOUNTER
DOCUMENTATION OF PRIOR AUTH STATUS    1. Medication name and dose: Dulera 100-5 mcg    2. Name and Phone # of Prescription coverage company: [employer] [phone]    3. Date Prior Auth was submitted: 7/5/2018    4. What information was given to obtain insurance decision: Clinical notes    5. Prior Auth letter Approved or Denied: Approved through 7/5/2019    6. Pharmacy notified: Yes    7. Patient notified: Yes

## 2018-12-05 RX ORDER — OMEPRAZOLE 20 MG/1
CAPSULE, DELAYED RELEASE ORAL
Qty: 90 CAP | Refills: 3 | Status: SHIPPED | OUTPATIENT
Start: 2018-12-05 | End: 2020-01-08 | Stop reason: SDUPTHER

## 2018-12-05 NOTE — TELEPHONE ENCOUNTER
Have we ever prescribed this med? Yes.  If yes, what date? 9/14/17    Last OV: 7/3/18- Kelsey    Next OV: annual follow ups- no appt on file    DX: Cough    Medications: Prilosec CR CAP 20 MG    Message sent to Teressa ma isn't in.

## 2019-02-15 ENCOUNTER — APPOINTMENT (OUTPATIENT)
Dept: PULMONOLOGY | Facility: HOSPICE | Age: 64
End: 2019-02-15
Payer: COMMERCIAL

## 2019-03-05 ENCOUNTER — TELEPHONE (OUTPATIENT)
Dept: PULMONOLOGY | Facility: HOSPICE | Age: 64
End: 2019-03-05

## 2019-03-05 ENCOUNTER — OFFICE VISIT (OUTPATIENT)
Dept: PULMONOLOGY | Facility: HOSPICE | Age: 64
End: 2019-03-05
Payer: COMMERCIAL

## 2019-03-05 ENCOUNTER — APPOINTMENT (OUTPATIENT)
Dept: RADIOLOGY | Facility: IMAGING CENTER | Age: 64
End: 2019-03-05
Attending: NURSE PRACTITIONER
Payer: COMMERCIAL

## 2019-03-05 VITALS
WEIGHT: 203.8 LBS | DIASTOLIC BLOOD PRESSURE: 70 MMHG | HEART RATE: 84 BPM | BODY MASS INDEX: 29.18 KG/M2 | TEMPERATURE: 99 F | HEIGHT: 70 IN | RESPIRATION RATE: 16 BRPM | SYSTOLIC BLOOD PRESSURE: 126 MMHG | OXYGEN SATURATION: 97 %

## 2019-03-05 DIAGNOSIS — K21.9 GASTROESOPHAGEAL REFLUX DISEASE, ESOPHAGITIS PRESENCE NOT SPECIFIED: ICD-10-CM

## 2019-03-05 DIAGNOSIS — R05.9 COUGH: ICD-10-CM

## 2019-03-05 DIAGNOSIS — J30.2 SEASONAL ALLERGIES: ICD-10-CM

## 2019-03-05 DIAGNOSIS — J45.40 MODERATE PERSISTENT ASTHMA WITHOUT COMPLICATION: ICD-10-CM

## 2019-03-05 DIAGNOSIS — Z87.891 FORMER SMOKER: ICD-10-CM

## 2019-03-05 PROCEDURE — 71046 X-RAY EXAM CHEST 2 VIEWS: CPT | Mod: 26 | Performed by: NURSE PRACTITIONER

## 2019-03-05 PROCEDURE — 99214 OFFICE O/P EST MOD 30 MIN: CPT | Performed by: NURSE PRACTITIONER

## 2019-03-05 ASSESSMENT — ENCOUNTER SYMPTOMS
SPUTUM PRODUCTION: 0
NEUROLOGICAL NEGATIVE: 1
COUGH: 1
SHORTNESS OF BREATH: 0
MUSCULOSKELETAL NEGATIVE: 1
PSYCHIATRIC NEGATIVE: 1
HEMOPTYSIS: 0
BRUISES/BLEEDS EASILY: 0
EYE PAIN: 0
GASTROINTESTINAL NEGATIVE: 1
CONSTITUTIONAL NEGATIVE: 1
CARDIOVASCULAR NEGATIVE: 1
EYE DISCHARGE: 0
WHEEZING: 0

## 2019-03-05 NOTE — PROGRESS NOTES
Chief Complaint   Patient presents with   • Asthma     Last seen 07/03/18         HPI: This patient is a 63 y.o. male, who presents for follow-up asthma and cough.     Patient is a former smoker 20 pack year history quit in 1988. He reported a intermittent cough at the time of his last visit in July.  Cough comes and goes typically last for several months at a time.  Is productive of clear phlegm.  He denies postnasal drip or sinus disease.  He reports seasonal and environmental allergies for which he takes Singulair with subjective benefit.  Former PFTs indicate an FEV1 of 2.81 L 76% predicted.  Uses Dulera once a day.  Has been recommended that he uses twice a day but has not done this.  He has albuterol but rarely uses.  He reports cough is been ongoing for many years.  Chest x-ray today is unremarkable.  Prior swallow evaluation showed a small hiatal hernia, prominent GERD, occasional secondary and tertiary esophageal waves consistent with dysmotility.  He does report cough is typically worse during or after eating.  He was previously taking omeprazole but has discontinued.  He denies significant reflux.  He has not seen GI in many years.  He denies other pulmonary complaints including wheeze or dyspnea.  He denies URI, no history of pneumonia.    Past Medical History:   Diagnosis Date   • Asthma        Social History   Substance Use Topics   • Smoking status: Former Smoker     Packs/day: 1.00     Years: 20.00     Types: Cigarettes     Quit date: 1/1/1988   • Smokeless tobacco: Never Used      Comment: Years unknown-please verify   • Alcohol use 0.6 - 1.8 oz/week     1 - 3 Standard drinks or equivalent per week      Comment: Hard liquor, once a week        Family History   Problem Relation Age of Onset   • Lung Cancer Father        Immunization History   Administered Date(s) Administered   • Influenza Seasonal Injectable 09/18/2015   • Influenza Vaccine H1N1 10/12/2009   • Influenza Vaccine Quad Inj (Pf)  "12/22/2017   • Pneumococcal polysaccharide vaccine (PPSV-23) 07/03/2018   • Tdap Vaccine 10/28/2017   • Tuberculin Skin Test 06/30/2017   • Zoster Vaccine Live (ZVL) (Zostavax) 09/18/2015       Current medications as of today   Current Outpatient Prescriptions   Medication Sig Dispense Refill   • Fluticasone Furoate-Vilanterol (BREO ELLIPTA) 200-25 MCG/INH AEROSOL POWDER, BREATH ACTIVATED Inhale 1 Puff by mouth every day. Rinse mouth after use. 2 Each 0   • omeprazole (PRILOSEC) 20 MG delayed-release capsule Take 1 capsule by mouth  every morning 30 minutes  before breakfast 90 Cap 3   • montelukast (SINGULAIR) 10 MG Tab Take 1 tablet by mouth  every night at bedtime 90 Tab 3   • albuterol (PROAIR HFA) 108 (90 BASE) MCG/ACT Aero Soln inhalation aerosol Inhale 2 Puffs by mouth every four hours as needed for Shortness of Breath.     • brompheniramine-pseudoephedrine-DM 30-2-10 MG/5ML syrup TAKE 10 ML (2 TEASPOONFUL) BY MOUTH THREE TIMES A DAY FOR 5 DAYS  0   • predniSONE (DELTASONE) 20 MG Tab TAKE 3 TABLETS BY MOUTH EVERY DAY FOR 2 DAYS THEN 2 TABLETS DAILY FOR 3 DAYS *TAKE WITH FOOD*  0   • clindamycin (CLEOCIN) 300 MG Cap TAKE ONE CAPSULE BY MOUTH EVERY 8 HOURS FOR 10 DAYS  0   • albuterol (PROVENTIL) 2.5mg/3ml Nebu Soln solution for nebulization 3 mL by Nebulization route every four hours as needed for Shortness of Breath. 180 mL 3   • guaifenesin LA (MUCINEX) 600 MG TABLET SR 12 HR Take 600 mg by mouth every 12 hours.       No current facility-administered medications for this visit.        Allergies: Patient has no known allergies.    Blood pressure 126/70, pulse 84, temperature 37.2 °C (99 °F), temperature source Temporal, resp. rate 16, height 1.778 m (5' 10\"), weight 92.4 kg (203 lb 12.8 oz), SpO2 97 %.      Review of Systems   Constitutional: Negative.    HENT: Negative.         Sneezing  Seasonal allergies     Eyes: Negative for pain and discharge.   Respiratory: Positive for cough. Negative for hemoptysis, " sputum production, shortness of breath and wheezing.    Cardiovascular: Negative.    Gastrointestinal: Negative.    Musculoskeletal: Negative.    Skin: Negative.    Neurological: Negative.    Endo/Heme/Allergies: Negative for environmental allergies. Does not bruise/bleed easily.   Psychiatric/Behavioral: Negative.        Physical Exam   Constitutional: He is oriented to person, place, and time and well-developed, well-nourished, and in no distress.   HENT:   Head: Normocephalic and atraumatic.   Mouth/Throat: Oropharynx is clear and moist.   Eyes: Pupils are equal, round, and reactive to light.   Neck: Normal range of motion. Neck supple. No tracheal deviation present.   Cardiovascular: Normal rate, regular rhythm and normal heart sounds.    Pulmonary/Chest: Effort normal and breath sounds normal.   Musculoskeletal: Normal range of motion. He exhibits no edema.   Neurological: He is alert and oriented to person, place, and time.   Skin: Skin is warm and dry.   Psychiatric: Mood, memory, affect and judgment normal.   Vitals reviewed.      Diagnoses/Plan:    1. Moderate persistent asthma without complication  Recommend trial of Breo.  Patient is using Dulera but only uses once per day.  He was demonstrated use and provided a sample to try.  If he finds this beneficial prior authorization can be completed.  He will continue albuterol as needed.  - Fluticasone Furoate-Vilanterol (BREO ELLIPTA) 200-25 MCG/INH AEROSOL POWDER, BREATH ACTIVATED; Inhale 1 Puff by mouth every day. Rinse mouth after use.  Dispense: 2 Each; Refill: 0    2. Seasonal allergies  Stable, continue Singulair.    3. Gastroesophageal reflux disease, esophagitis presence not specified  Recommend that he restart omeprazole daily.    4. Former smoker  Permanent cessation recommended    5. Cough  Likely multifactorial, may be in part related to asthma and in part related to GERD.  Chest x-ray today is unremarkable.  Patient quit smoking over 20 years  edson.    I would like him to follow-up with a pulmonary physician to review.  He needs Friday visits.  I requested he follow-up with Dr. Cisse to determine if trial of Breo and Omeprazole are effective for cough.            This dictation was created using voice recognition software. The accuracy of the dictation is limited to the abilities of the software. I expect there may be some errors of grammar and possibly content.

## 2019-03-05 NOTE — TELEPHONE ENCOUNTER
Per Last Visit on 07/03/18 A Chest X-ray was ordered, called patient to see if it was ever completed and patient stated he didn't get the chance to complete chest X-ray.     Patient has appointment today with Arabella PINO at 3:00pm.

## 2019-03-19 DIAGNOSIS — J45.40 MODERATE PERSISTENT ASTHMA WITHOUT COMPLICATION: ICD-10-CM

## 2019-03-19 NOTE — TELEPHONE ENCOUNTER
Have we ever prescribed this med? Yes.  If yes, what date? 03/05/2019    Last OV: 03/05/2019-Kelsey     Next OV: 04/19/2019-     DX: Moderate persistent asthma without complication (J45.40)    Medications:   Requested Prescriptions     Pending Prescriptions Disp Refills   • Fluticasone Furoate-Vilanterol (BREO ELLIPTA) 200-25 MCG/INH AEROSOL POWDER, BREATH ACTIVATED 2 Each 0     Sig: Inhale 1 Puff by mouth every day. Rinse mouth after use.

## 2019-04-19 ENCOUNTER — NON-PROVIDER VISIT (OUTPATIENT)
Dept: PULMONOLOGY | Facility: HOSPICE | Age: 64
End: 2019-04-19
Payer: COMMERCIAL

## 2019-04-19 ENCOUNTER — OFFICE VISIT (OUTPATIENT)
Dept: PULMONOLOGY | Facility: HOSPICE | Age: 64
End: 2019-04-19
Payer: COMMERCIAL

## 2019-04-19 VITALS
HEART RATE: 79 BPM | WEIGHT: 200 LBS | BODY MASS INDEX: 28.63 KG/M2 | SYSTOLIC BLOOD PRESSURE: 116 MMHG | DIASTOLIC BLOOD PRESSURE: 68 MMHG | HEIGHT: 70 IN | RESPIRATION RATE: 16 BRPM | OXYGEN SATURATION: 94 %

## 2019-04-19 DIAGNOSIS — J45.40 MODERATE PERSISTENT ASTHMA WITHOUT COMPLICATION: ICD-10-CM

## 2019-04-19 DIAGNOSIS — K21.9 GASTROESOPHAGEAL REFLUX DISEASE, ESOPHAGITIS PRESENCE NOT SPECIFIED: ICD-10-CM

## 2019-04-19 DIAGNOSIS — R05.9 COUGH: ICD-10-CM

## 2019-04-19 PROCEDURE — 94010 BREATHING CAPACITY TEST: CPT | Performed by: INTERNAL MEDICINE

## 2019-04-19 PROCEDURE — 99213 OFFICE O/P EST LOW 20 MIN: CPT | Mod: 25 | Performed by: INTERNAL MEDICINE

## 2019-04-19 ASSESSMENT — PULMONARY FUNCTION TESTS
FEV1/FVC_PREDICTED: 77.56
FEV1/FVC_PERCENT_PREDICTED: 90
FEV1_PREDICTED: 72
FVC_PERCENT_PREDICTED: 80
FEV1: 2.57
FVC_PREDICTED: 4.59
FEV1/FVC: 70
FVC: 3.68
FEV1_PREDICTED: 3.56

## 2019-04-19 ASSESSMENT — ENCOUNTER SYMPTOMS
EYES NEGATIVE: 1
COUGH: 1
NEUROLOGICAL NEGATIVE: 1
CARDIOVASCULAR NEGATIVE: 1
GASTROINTESTINAL NEGATIVE: 1
CONSTITUTIONAL NEGATIVE: 1
PSYCHIATRIC NEGATIVE: 1
MUSCULOSKELETAL NEGATIVE: 1

## 2019-04-19 NOTE — PROGRESS NOTES
Pulmonary Clinic follow up    Date of Service: 4/19/2019    Reason for follow up:  Follow-Up and Asthma (Breo and Prilosec added at last visit - states that they are helping )      Problem List Items Addressed This Visit     Moderate persistent asthma without complication     Per patient it is normally seasonal.  Is not sure if the Brio really helped or it was just end of the season.  So we will trial off Brio and see how he does.  And return to clinic in October when his symptoms do occur.  He does have copious amount of mucus production when his symptoms occur and usually it is related again for the winter months so we will trial a flutter valve 3 times a day.  Flutter valve will decrease the nidus for infection as well as decrease the cycle of inflammation with chronic mucus production.  We will see how he does at that time and still may need to go back on an inhaler but will reassess at that time.  Unable to get to baseline as patient became symptomatic during PFTs         GERD (gastroesophageal reflux disease)     Reviewed GERD behavioral modification.  Patient does like mint tea which could be causing reflux which could be worsening his cough.  Reviewed other GERD behavior modification which patient will try and comply to especially during the seasonal time of his symptoms.  Omeprazole he can take however I did review the side effects for long-term use of PPIs.  Short-term is definitely okay.  Reviewed with the patient who agrees at this time to attempt good behavioral modification and reassess the need for omeprazole.         Cough    Relevant Orders    Spirometry (Completed)            History of Present Illness: Stew Ramos is a 64 y.o. male with a past medical history  of I last saw patient on former smoker 20 pack year history quit in 1988  PFTs indicate an FEV1 of 2.81 L 76% predicted this was in 2017 and midflow had a response  BD and not FEV1 and or FVC  swallow evaluation showed a small hiatal  hernia, prominent GERD, occasional secondary and tertiary esophageal waves consistent with dysmotility  Main symptoms is cough  Last seen 3/5/19 and started Breo and omeprazole   Feels better on Breo and omeprazole  Not sure what to attribute it to  Also says his cough is seasonal    Patient unable to complete 3 trials for PFTS and pt  became dizzy and had a sensation of passing out  The one results we do have FEV1 over FVC 70% with an FEV1 of 72% with flow volume loops consistent with peripheral airway obstruction       Review of Systems   Constitutional: Negative.    HENT: Negative.    Eyes: Negative.    Respiratory: Positive for cough.    Cardiovascular: Negative.    Gastrointestinal: Negative.    Genitourinary: Negative.    Musculoskeletal: Negative.    Skin: Negative.    Neurological: Negative.    Endo/Heme/Allergies: Negative.    Psychiatric/Behavioral: Negative.        Current Outpatient Prescriptions on File Prior to Visit   Medication Sig Dispense Refill   • Fluticasone Furoate-Vilanterol (BREO ELLIPTA) 200-25 MCG/INH AEROSOL POWDER, BREATH ACTIVATED Inhale 1 Puff by mouth every day. Rinse mouth after use. 2 Each 0   • omeprazole (PRILOSEC) 20 MG delayed-release capsule Take 1 capsule by mouth  every morning 30 minutes  before breakfast 90 Cap 3   • brompheniramine-pseudoephedrine-DM 30-2-10 MG/5ML syrup TAKE 10 ML (2 TEASPOONFUL) BY MOUTH THREE TIMES A DAY FOR 5 DAYS  0   • albuterol (PROVENTIL) 2.5mg/3ml Nebu Soln solution for nebulization 3 mL by Nebulization route every four hours as needed for Shortness of Breath. 180 mL 3   • montelukast (SINGULAIR) 10 MG Tab Take 1 tablet by mouth  every night at bedtime 90 Tab 3   • guaifenesin LA (MUCINEX) 600 MG TABLET SR 12 HR Take 600 mg by mouth every 12 hours.     • albuterol (PROAIR HFA) 108 (90 BASE) MCG/ACT Aero Soln inhalation aerosol Inhale 2 Puffs by mouth every four hours as needed for Shortness of Breath.       No current facility-administered  "medications on file prior to visit.        Social History   Substance Use Topics   • Smoking status: Former Smoker     Packs/day: 1.00     Years: 20.00     Types: Cigarettes     Quit date: 1/1/1988   • Smokeless tobacco: Never Used      Comment: Years unknown-please verify   • Alcohol use 0.6 - 1.8 oz/week     1 - 3 Standard drinks or equivalent per week      Comment: Hard liquor, once a week         Past Medical History:   Diagnosis Date   • Asthma        Past Surgical History:   Procedure Laterality Date   • HERNIA REPAIR         Allergies: Patient has no known allergies.    Family History   Problem Relation Age of Onset   • Lung Cancer Father        Vitals:    04/19/19 1432 04/19/19 1434   Height: 1.778 m (5' 10\")    Weight: 90.7 kg (200 lb)    Weight % change since last entry.: 0 %    BP: 116/68    Pulse: 79    BMI (Calculated): 28.7    Resp: 16    O2 sat % room air:  94 %       Physical Examination  Physical Exam   Constitutional: He is oriented to person, place, and time and well-developed, well-nourished, and in no distress. No distress.   HENT:   Head: Normocephalic and atraumatic.   Right Ear: External ear normal.   Left Ear: External ear normal.   Nose: Nose normal.   Mouth/Throat: Oropharynx is clear and moist. No oropharyngeal exudate.   Eyes: Pupils are equal, round, and reactive to light. Conjunctivae and EOM are normal.   Neck: Normal range of motion. Neck supple. No JVD present. No tracheal deviation present. No thyromegaly present.   Cardiovascular: Normal rate, regular rhythm, normal heart sounds and intact distal pulses.  Exam reveals no gallop and no friction rub.    No murmur heard.  Pulmonary/Chest: Effort normal and breath sounds normal. No stridor. No respiratory distress. He has no wheezes. He has no rales. He exhibits no tenderness.   Abdominal: Soft. Bowel sounds are normal. He exhibits no distension and no mass.   Musculoskeletal: Normal range of motion. He exhibits no edema or deformity. "   Lymphadenopathy:     He has no cervical adenopathy.   Neurological: He is alert and oriented to person, place, and time. No cranial nerve deficit. He exhibits normal muscle tone. Gait normal. Coordination normal. GCS score is 15.   Skin: No rash noted. He is not diaphoretic.   Psychiatric: Mood, memory, affect and judgment normal.        Yana Wall MD MPH JANI  [facility]

## 2019-04-19 NOTE — ASSESSMENT & PLAN NOTE
Reviewed GERD behavioral modification.  Patient does like mint tea which could be causing reflux which could be worsening his cough.  Reviewed other GERD behavior modification which patient will try and comply to especially during the seasonal time of his symptoms.  Omeprazole he can take however I did review the side effects for long-term use of PPIs.  Short-term is definitely okay.  Reviewed with the patient who agrees at this time to attempt good behavioral modification and reassess the need for omeprazole.

## 2019-04-19 NOTE — ASSESSMENT & PLAN NOTE
Per patient it is normally seasonal.  Is not sure if the Brio really helped or it was just end of the season.  So we will trial off Brio and see how he does.  And return to clinic in October when his symptoms do occur.  He does have copious amount of mucus production when his symptoms occur and usually it is related again for the winter months so we will trial a flutter valve 3 times a day.  Flutter valve will decrease the nidus for infection as well as decrease the cycle of inflammation with chronic mucus production.  We will see how he does at that time and still may need to go back on an inhaler but will reassess at that time.  Unable to get to baseline as patient became symptomatic during PFTs

## 2019-04-19 NOTE — PROCEDURES
Good patient effort and cooperation.   Patient unable to complete 3 trials this became dizzy and had a sensation of passing out  The one results we do have FEV1 over FVC 70% with an FEV1 of 72% with flow volume loops consistent with peripheral airway obstruction

## 2019-04-22 DIAGNOSIS — J45.40 MODERATE PERSISTENT ASTHMA WITHOUT COMPLICATION: ICD-10-CM

## 2019-04-22 NOTE — TELEPHONE ENCOUNTER
Have we ever prescribed this med? Yes.  If yes, what date? 3/19/19    Last OV: 4/19/19    Next OV: 11/1/19    DX: Moderate persistent asthma without complication (J45.40)    Medications: Fluticasone Furoate-Vilanterol (BREO ELLIPTA) 200-25 MCG/INH AEROSOL POWDER, BREATH ACTIVATED

## 2020-01-08 DIAGNOSIS — R05.9 COUGH: ICD-10-CM

## 2020-01-08 RX ORDER — OMEPRAZOLE 20 MG/1
CAPSULE, DELAYED RELEASE ORAL
Qty: 90 CAP | Refills: 0 | Status: SHIPPED | OUTPATIENT
Start: 2020-01-08 | End: 2022-09-17

## 2020-01-08 NOTE — TELEPHONE ENCOUNTER
FAX:  Have we ever prescribed this med? Yes.  If yes, what date? 12/05/18(Dwaine)    Last OV: 04/19/19 with Dr Cisse   Return in about 6 months (around 10/19/2019) for with me.      Next OV: NO Pending appt.     DX: Cough (R05)    Medications:   Requested Prescriptions     Pending Prescriptions Disp Refills   • omeprazole (PRILOSEC) 20 MG delayed-release capsule 90 Cap 3     Sig: Take 1 capsule by mouth  every morning 30 minutes  before breakfast

## 2020-03-30 ENCOUNTER — TELEPHONE (OUTPATIENT)
Dept: HEALTH INFORMATION MANAGEMENT | Facility: OTHER | Age: 65
End: 2020-03-30

## 2020-03-30 NOTE — TELEPHONE ENCOUNTER
1. Caller Name: Stew                        Call Back Number: [phone]  [facility] PCP or Specialty Provider: Marely Pinedo.         2.  Does patient have any active symptoms of respiratory illness (fever OR cough OR shortness of breath OR sore throat)? Yes, the patient reports the following respiratory symptoms: cough and sore throat. About 2 weeks.    3.  Does patient have any comoribidities? None     4.  Has the patient traveled in the last 14 days OR had any known contact with someone who is suspected or confirmed to have COVID-19?  Yes, travels weekly. Traveled March 13th to [place].    5. POTENTIAL PUI (LOW): Offered virtual visit to limit potential exposure to others; patient also given self care instructions         Note routed to [facility] Provider: STEVE only.

## 2022-09-17 ENCOUNTER — ANESTHESIA (OUTPATIENT)
Dept: SURGERY | Facility: MEDICAL CENTER | Age: 67
End: 2022-09-17
Payer: COMMERCIAL

## 2022-09-17 ENCOUNTER — APPOINTMENT (OUTPATIENT)
Dept: RADIOLOGY | Facility: MEDICAL CENTER | Age: 67
End: 2022-09-17
Attending: EMERGENCY MEDICINE
Payer: COMMERCIAL

## 2022-09-17 ENCOUNTER — HOSPITAL ENCOUNTER (OUTPATIENT)
Facility: MEDICAL CENTER | Age: 67
End: 2022-09-18
Attending: EMERGENCY MEDICINE | Admitting: SURGERY
Payer: COMMERCIAL

## 2022-09-17 ENCOUNTER — ANESTHESIA EVENT (OUTPATIENT)
Dept: SURGERY | Facility: MEDICAL CENTER | Age: 67
End: 2022-09-17
Payer: COMMERCIAL

## 2022-09-17 DIAGNOSIS — K35.30 ACUTE APPENDICITIS WITH LOCALIZED PERITONITIS, WITHOUT PERFORATION, ABSCESS, OR GANGRENE: ICD-10-CM

## 2022-09-17 DIAGNOSIS — G89.18 POST-OP PAIN: ICD-10-CM

## 2022-09-17 LAB
ALBUMIN SERPL BCP-MCNC: 4.4 G/DL (ref 3.2–4.9)
ALBUMIN/GLOB SERPL: 1.7 G/DL
ALP SERPL-CCNC: 112 U/L (ref 30–99)
ALT SERPL-CCNC: 20 U/L (ref 2–50)
ANION GAP SERPL CALC-SCNC: 14 MMOL/L (ref 7–16)
APPEARANCE UR: CLEAR
AST SERPL-CCNC: 22 U/L (ref 12–45)
BASOPHILS # BLD AUTO: 0.3 % (ref 0–1.8)
BASOPHILS # BLD: 0.04 K/UL (ref 0–0.12)
BILIRUB SERPL-MCNC: 1.7 MG/DL (ref 0.1–1.5)
BILIRUB UR QL STRIP.AUTO: NEGATIVE
BUN SERPL-MCNC: 13 MG/DL (ref 8–22)
CALCIUM SERPL-MCNC: 8.8 MG/DL (ref 8.4–10.2)
CHLORIDE SERPL-SCNC: 99 MMOL/L (ref 96–112)
CO2 SERPL-SCNC: 20 MMOL/L (ref 20–33)
COLOR UR: ABNORMAL
CREAT SERPL-MCNC: 0.95 MG/DL (ref 0.5–1.4)
EOSINOPHIL # BLD AUTO: 0.03 K/UL (ref 0–0.51)
EOSINOPHIL NFR BLD: 0.2 % (ref 0–6.9)
EPI CELLS #/AREA URNS HPF: NORMAL /HPF
ERYTHROCYTE [DISTWIDTH] IN BLOOD BY AUTOMATED COUNT: 44.9 FL (ref 35.9–50)
GFR SERPLBLD CREATININE-BSD FMLA CKD-EPI: 88 ML/MIN/1.73 M 2
GLOBULIN SER CALC-MCNC: 2.6 G/DL (ref 1.9–3.5)
GLUCOSE SERPL-MCNC: 80 MG/DL (ref 65–99)
GLUCOSE UR STRIP.AUTO-MCNC: NEGATIVE MG/DL
HCT VFR BLD AUTO: 50.8 % (ref 42–52)
HGB BLD-MCNC: 17.2 G/DL (ref 14–18)
IMM GRANULOCYTES # BLD AUTO: 0.05 K/UL (ref 0–0.11)
IMM GRANULOCYTES NFR BLD AUTO: 0.4 % (ref 0–0.9)
KETONES UR STRIP.AUTO-MCNC: >=80 MG/DL
LEUKOCYTE ESTERASE UR QL STRIP.AUTO: NEGATIVE
LIPASE SERPL-CCNC: 22 U/L (ref 7–58)
LYMPHOCYTES # BLD AUTO: 0.67 K/UL (ref 1–4.8)
LYMPHOCYTES NFR BLD: 5.1 % (ref 22–41)
MCH RBC QN AUTO: 31.8 PG (ref 27–33)
MCHC RBC AUTO-ENTMCNC: 33.9 G/DL (ref 33.7–35.3)
MCV RBC AUTO: 93.9 FL (ref 81.4–97.8)
MICRO URNS: ABNORMAL
MONOCYTES # BLD AUTO: 0.54 K/UL (ref 0–0.85)
MONOCYTES NFR BLD AUTO: 4.1 % (ref 0–13.4)
MUCOUS THREADS #/AREA URNS HPF: NORMAL /HPF
NEUTROPHILS # BLD AUTO: 11.73 K/UL (ref 1.82–7.42)
NEUTROPHILS NFR BLD: 89.9 % (ref 44–72)
NITRITE UR QL STRIP.AUTO: NEGATIVE
NRBC # BLD AUTO: 0 K/UL
NRBC BLD-RTO: 0 /100 WBC
PATHOLOGY CONSULT NOTE: NORMAL
PH UR STRIP.AUTO: 5 [PH] (ref 5–8)
PLATELET # BLD AUTO: 234 K/UL (ref 164–446)
PMV BLD AUTO: 10.3 FL (ref 9–12.9)
POTASSIUM SERPL-SCNC: 4.4 MMOL/L (ref 3.6–5.5)
PROT SERPL-MCNC: 7 G/DL (ref 6–8.2)
PROT UR QL STRIP: NEGATIVE MG/DL
RBC # BLD AUTO: 5.41 M/UL (ref 4.7–6.1)
RBC # URNS HPF: NORMAL /HPF
RBC UR QL AUTO: ABNORMAL
SODIUM SERPL-SCNC: 133 MMOL/L (ref 135–145)
SP GR UR STRIP.AUTO: 1.01
WBC # BLD AUTO: 13.1 K/UL (ref 4.8–10.8)

## 2022-09-17 PROCEDURE — 160041 HCHG SURGERY MINUTES - EA ADDL 1 MIN LEVEL 4: Performed by: SURGERY

## 2022-09-17 PROCEDURE — 700102 HCHG RX REV CODE 250 W/ 637 OVERRIDE(OP): Performed by: SURGERY

## 2022-09-17 PROCEDURE — 96376 TX/PRO/DX INJ SAME DRUG ADON: CPT

## 2022-09-17 PROCEDURE — 160002 HCHG RECOVERY MINUTES (STAT): Performed by: SURGERY

## 2022-09-17 PROCEDURE — 96375 TX/PRO/DX INJ NEW DRUG ADDON: CPT

## 2022-09-17 PROCEDURE — 94760 N-INVAS EAR/PLS OXIMETRY 1: CPT

## 2022-09-17 PROCEDURE — 700111 HCHG RX REV CODE 636 W/ 250 OVERRIDE (IP): Performed by: ANESTHESIOLOGY

## 2022-09-17 PROCEDURE — 83690 ASSAY OF LIPASE: CPT

## 2022-09-17 PROCEDURE — 700105 HCHG RX REV CODE 258: Performed by: ANESTHESIOLOGY

## 2022-09-17 PROCEDURE — 160048 HCHG OR STATISTICAL LEVEL 1-5: Performed by: SURGERY

## 2022-09-17 PROCEDURE — 88304 TISSUE EXAM BY PATHOLOGIST: CPT

## 2022-09-17 PROCEDURE — 85025 COMPLETE CBC W/AUTO DIFF WBC: CPT

## 2022-09-17 PROCEDURE — 80053 COMPREHEN METABOLIC PANEL: CPT

## 2022-09-17 PROCEDURE — 700101 HCHG RX REV CODE 250: Performed by: SURGERY

## 2022-09-17 PROCEDURE — G0378 HOSPITAL OBSERVATION PER HR: HCPCS

## 2022-09-17 PROCEDURE — A9270 NON-COVERED ITEM OR SERVICE: HCPCS | Performed by: SURGERY

## 2022-09-17 PROCEDURE — 160029 HCHG SURGERY MINUTES - 1ST 30 MINS LEVEL 4: Performed by: SURGERY

## 2022-09-17 PROCEDURE — 160035 HCHG PACU - 1ST 60 MINS PHASE I: Performed by: SURGERY

## 2022-09-17 PROCEDURE — 110371 HCHG SHELL REV 272: Performed by: SURGERY

## 2022-09-17 PROCEDURE — 700101 HCHG RX REV CODE 250: Performed by: EMERGENCY MEDICINE

## 2022-09-17 PROCEDURE — 96365 THER/PROPH/DIAG IV INF INIT: CPT

## 2022-09-17 PROCEDURE — 99291 CRITICAL CARE FIRST HOUR: CPT

## 2022-09-17 PROCEDURE — 36415 COLL VENOUS BLD VENIPUNCTURE: CPT

## 2022-09-17 PROCEDURE — 74177 CT ABD & PELVIS W/CONTRAST: CPT

## 2022-09-17 PROCEDURE — 00840 ANES IPER PX LOWER ABD NOS: CPT | Performed by: ANESTHESIOLOGY

## 2022-09-17 PROCEDURE — 700117 HCHG RX CONTRAST REV CODE 255: Performed by: EMERGENCY MEDICINE

## 2022-09-17 PROCEDURE — 700105 HCHG RX REV CODE 258: Performed by: EMERGENCY MEDICINE

## 2022-09-17 PROCEDURE — 81001 URINALYSIS AUTO W/SCOPE: CPT

## 2022-09-17 PROCEDURE — 700111 HCHG RX REV CODE 636 W/ 250 OVERRIDE (IP): Performed by: EMERGENCY MEDICINE

## 2022-09-17 PROCEDURE — 160009 HCHG ANES TIME/MIN: Performed by: SURGERY

## 2022-09-17 PROCEDURE — 700101 HCHG RX REV CODE 250: Performed by: ANESTHESIOLOGY

## 2022-09-17 RX ORDER — ACETAMINOPHEN 500 MG
TABLET ORAL EVERY 6 HOURS PRN
Status: ON HOLD | COMMUNITY
End: 2022-09-18

## 2022-09-17 RX ORDER — LABETALOL HYDROCHLORIDE 5 MG/ML
5 INJECTION, SOLUTION INTRAVENOUS
Status: DISCONTINUED | OUTPATIENT
Start: 2022-09-17 | End: 2022-09-17 | Stop reason: HOSPADM

## 2022-09-17 RX ORDER — BUPIVACAINE HYDROCHLORIDE AND EPINEPHRINE 5; 5 MG/ML; UG/ML
INJECTION, SOLUTION EPIDURAL; INTRACAUDAL; PERINEURAL
Status: DISCONTINUED | OUTPATIENT
Start: 2022-09-17 | End: 2022-09-17 | Stop reason: HOSPADM

## 2022-09-17 RX ORDER — AMOXICILLIN AND CLAVULANATE POTASSIUM 875; 125 MG/1; MG/1
1 TABLET, FILM COATED ORAL 2 TIMES DAILY
Status: DISCONTINUED | OUTPATIENT
Start: 2022-09-17 | End: 2022-09-18 | Stop reason: HOSPADM

## 2022-09-17 RX ORDER — SODIUM CHLORIDE 9 MG/ML
500 INJECTION, SOLUTION INTRAVENOUS
Status: DISCONTINUED | OUTPATIENT
Start: 2022-09-17 | End: 2022-09-17 | Stop reason: HOSPADM

## 2022-09-17 RX ORDER — SODIUM CHLORIDE, SODIUM LACTATE, POTASSIUM CHLORIDE, CALCIUM CHLORIDE 600; 310; 30; 20 MG/100ML; MG/100ML; MG/100ML; MG/100ML
INJECTION, SOLUTION INTRAVENOUS CONTINUOUS
Status: ACTIVE | OUTPATIENT
Start: 2022-09-17 | End: 2022-09-17

## 2022-09-17 RX ORDER — MEPERIDINE HYDROCHLORIDE 25 MG/ML
6.25 INJECTION INTRAMUSCULAR; INTRAVENOUS; SUBCUTANEOUS
Status: DISCONTINUED | OUTPATIENT
Start: 2022-09-17 | End: 2022-09-17 | Stop reason: HOSPADM

## 2022-09-17 RX ORDER — METRONIDAZOLE 500 MG/100ML
500 INJECTION, SOLUTION INTRAVENOUS ONCE
Status: COMPLETED | OUTPATIENT
Start: 2022-09-17 | End: 2022-09-17

## 2022-09-17 RX ORDER — SODIUM CHLORIDE 9 MG/ML
INJECTION, SOLUTION INTRAVENOUS CONTINUOUS
Status: DISCONTINUED | OUTPATIENT
Start: 2022-09-17 | End: 2022-09-17

## 2022-09-17 RX ORDER — MORPHINE SULFATE 4 MG/ML
4 INJECTION INTRAVENOUS ONCE
Status: COMPLETED | OUTPATIENT
Start: 2022-09-17 | End: 2022-09-17

## 2022-09-17 RX ORDER — OXYCODONE HYDROCHLORIDE AND ACETAMINOPHEN 5; 325 MG/1; MG/1
1 TABLET ORAL EVERY 4 HOURS PRN
Status: DISCONTINUED | OUTPATIENT
Start: 2022-09-17 | End: 2022-09-18 | Stop reason: HOSPADM

## 2022-09-17 RX ORDER — SODIUM CHLORIDE, SODIUM LACTATE, POTASSIUM CHLORIDE, CALCIUM CHLORIDE 600; 310; 30; 20 MG/100ML; MG/100ML; MG/100ML; MG/100ML
INJECTION, SOLUTION INTRAVENOUS CONTINUOUS
Status: DISCONTINUED | OUTPATIENT
Start: 2022-09-17 | End: 2022-09-17 | Stop reason: HOSPADM

## 2022-09-17 RX ORDER — CEFOTETAN DISODIUM 2 G/20ML
INJECTION, POWDER, FOR SOLUTION INTRAMUSCULAR; INTRAVENOUS PRN
Status: DISCONTINUED | OUTPATIENT
Start: 2022-09-17 | End: 2022-09-17 | Stop reason: SURG

## 2022-09-17 RX ORDER — OXYCODONE HCL 5 MG/5 ML
5 SOLUTION, ORAL ORAL
Status: DISCONTINUED | OUTPATIENT
Start: 2022-09-17 | End: 2022-09-17 | Stop reason: HOSPADM

## 2022-09-17 RX ORDER — SODIUM CHLORIDE, SODIUM LACTATE, POTASSIUM CHLORIDE, CALCIUM CHLORIDE 600; 310; 30; 20 MG/100ML; MG/100ML; MG/100ML; MG/100ML
INJECTION, SOLUTION INTRAVENOUS
Status: DISCONTINUED | OUTPATIENT
Start: 2022-09-17 | End: 2022-09-17 | Stop reason: SURG

## 2022-09-17 RX ORDER — ONDANSETRON 2 MG/ML
4 INJECTION INTRAMUSCULAR; INTRAVENOUS
Status: DISCONTINUED | OUTPATIENT
Start: 2022-09-17 | End: 2022-09-17 | Stop reason: HOSPADM

## 2022-09-17 RX ORDER — HYDRALAZINE HYDROCHLORIDE 20 MG/ML
5 INJECTION INTRAMUSCULAR; INTRAVENOUS
Status: DISCONTINUED | OUTPATIENT
Start: 2022-09-17 | End: 2022-09-17 | Stop reason: HOSPADM

## 2022-09-17 RX ORDER — ONDANSETRON 2 MG/ML
INJECTION INTRAMUSCULAR; INTRAVENOUS PRN
Status: DISCONTINUED | OUTPATIENT
Start: 2022-09-17 | End: 2022-09-17 | Stop reason: SURG

## 2022-09-17 RX ORDER — KETOROLAC TROMETHAMINE 30 MG/ML
INJECTION, SOLUTION INTRAMUSCULAR; INTRAVENOUS PRN
Status: DISCONTINUED | OUTPATIENT
Start: 2022-09-17 | End: 2022-09-17 | Stop reason: SURG

## 2022-09-17 RX ORDER — ONDANSETRON 4 MG/1
4 TABLET, ORALLY DISINTEGRATING ORAL EVERY 4 HOURS PRN
Status: DISCONTINUED | OUTPATIENT
Start: 2022-09-17 | End: 2022-09-18 | Stop reason: HOSPADM

## 2022-09-17 RX ORDER — KETOROLAC TROMETHAMINE 30 MG/ML
15 INJECTION, SOLUTION INTRAMUSCULAR; INTRAVENOUS EVERY 6 HOURS PRN
Status: DISCONTINUED | OUTPATIENT
Start: 2022-09-17 | End: 2022-09-18 | Stop reason: HOSPADM

## 2022-09-17 RX ORDER — ONDANSETRON 2 MG/ML
4 INJECTION INTRAMUSCULAR; INTRAVENOUS ONCE
Status: COMPLETED | OUTPATIENT
Start: 2022-09-17 | End: 2022-09-17

## 2022-09-17 RX ORDER — HYDROMORPHONE HYDROCHLORIDE 1 MG/ML
0.1 INJECTION, SOLUTION INTRAMUSCULAR; INTRAVENOUS; SUBCUTANEOUS
Status: DISCONTINUED | OUTPATIENT
Start: 2022-09-17 | End: 2022-09-17 | Stop reason: HOSPADM

## 2022-09-17 RX ORDER — HYDROMORPHONE HYDROCHLORIDE 1 MG/ML
0.4 INJECTION, SOLUTION INTRAMUSCULAR; INTRAVENOUS; SUBCUTANEOUS
Status: DISCONTINUED | OUTPATIENT
Start: 2022-09-17 | End: 2022-09-17 | Stop reason: HOSPADM

## 2022-09-17 RX ORDER — HALOPERIDOL 5 MG/ML
1 INJECTION INTRAMUSCULAR
Status: DISCONTINUED | OUTPATIENT
Start: 2022-09-17 | End: 2022-09-17 | Stop reason: HOSPADM

## 2022-09-17 RX ORDER — DEXAMETHASONE SODIUM PHOSPHATE 4 MG/ML
INJECTION, SOLUTION INTRA-ARTICULAR; INTRALESIONAL; INTRAMUSCULAR; INTRAVENOUS; SOFT TISSUE PRN
Status: DISCONTINUED | OUTPATIENT
Start: 2022-09-17 | End: 2022-09-17 | Stop reason: SURG

## 2022-09-17 RX ORDER — SODIUM CHLORIDE 9 MG/ML
1000 INJECTION, SOLUTION INTRAVENOUS ONCE
Status: COMPLETED | OUTPATIENT
Start: 2022-09-17 | End: 2022-09-17

## 2022-09-17 RX ORDER — METOPROLOL TARTRATE 1 MG/ML
1 INJECTION, SOLUTION INTRAVENOUS
Status: DISCONTINUED | OUTPATIENT
Start: 2022-09-17 | End: 2022-09-17 | Stop reason: HOSPADM

## 2022-09-17 RX ORDER — CEFTRIAXONE 2 G/1
2 INJECTION, POWDER, FOR SOLUTION INTRAMUSCULAR; INTRAVENOUS ONCE
Status: COMPLETED | OUTPATIENT
Start: 2022-09-17 | End: 2022-09-17

## 2022-09-17 RX ORDER — DIPHENHYDRAMINE HCL 25 MG
25 TABLET ORAL EVERY 6 HOURS PRN
Status: DISCONTINUED | OUTPATIENT
Start: 2022-09-17 | End: 2022-09-18 | Stop reason: HOSPADM

## 2022-09-17 RX ORDER — OXYCODONE HCL 5 MG/5 ML
10 SOLUTION, ORAL ORAL
Status: DISCONTINUED | OUTPATIENT
Start: 2022-09-17 | End: 2022-09-17 | Stop reason: HOSPADM

## 2022-09-17 RX ORDER — HYDROMORPHONE HYDROCHLORIDE 1 MG/ML
0.2 INJECTION, SOLUTION INTRAMUSCULAR; INTRAVENOUS; SUBCUTANEOUS
Status: DISCONTINUED | OUTPATIENT
Start: 2022-09-17 | End: 2022-09-17 | Stop reason: HOSPADM

## 2022-09-17 RX ORDER — ALBUTEROL SULFATE 2.5 MG/3ML
2.5 SOLUTION RESPIRATORY (INHALATION)
Status: DISCONTINUED | OUTPATIENT
Start: 2022-09-17 | End: 2022-09-17 | Stop reason: HOSPADM

## 2022-09-17 RX ORDER — HYDROMORPHONE HYDROCHLORIDE 1 MG/ML
1 INJECTION, SOLUTION INTRAMUSCULAR; INTRAVENOUS; SUBCUTANEOUS ONCE
Status: COMPLETED | OUTPATIENT
Start: 2022-09-17 | End: 2022-09-17

## 2022-09-17 RX ORDER — DIPHENHYDRAMINE HYDROCHLORIDE 50 MG/ML
12.5 INJECTION INTRAMUSCULAR; INTRAVENOUS
Status: DISCONTINUED | OUTPATIENT
Start: 2022-09-17 | End: 2022-09-17 | Stop reason: HOSPADM

## 2022-09-17 RX ORDER — LIDOCAINE HYDROCHLORIDE 20 MG/ML
INJECTION, SOLUTION EPIDURAL; INFILTRATION; INTRACAUDAL; PERINEURAL PRN
Status: DISCONTINUED | OUTPATIENT
Start: 2022-09-17 | End: 2022-09-17 | Stop reason: SURG

## 2022-09-17 RX ADMIN — SODIUM CHLORIDE 1000 ML: 9 INJECTION, SOLUTION INTRAVENOUS at 12:49

## 2022-09-17 RX ADMIN — MORPHINE SULFATE 4 MG: 4 INJECTION INTRAVENOUS at 14:55

## 2022-09-17 RX ADMIN — ROCURONIUM BROMIDE 40 MG: 10 INJECTION, SOLUTION INTRAVENOUS at 20:11

## 2022-09-17 RX ADMIN — MORPHINE SULFATE 4 MG: 4 INJECTION INTRAVENOUS at 13:11

## 2022-09-17 RX ADMIN — SODIUM CHLORIDE, POTASSIUM CHLORIDE, SODIUM LACTATE AND CALCIUM CHLORIDE: 600; 310; 30; 20 INJECTION, SOLUTION INTRAVENOUS at 19:57

## 2022-09-17 RX ADMIN — MORPHINE SULFATE 4 MG: 4 INJECTION INTRAVENOUS at 16:44

## 2022-09-17 RX ADMIN — DIPHENHYDRAMINE HCL 25 MG: 25 TABLET ORAL at 22:06

## 2022-09-17 RX ADMIN — MORPHINE SULFATE 4 MG: 4 INJECTION INTRAVENOUS at 18:13

## 2022-09-17 RX ADMIN — SODIUM CHLORIDE, SODIUM LACTATE, POTASSIUM CHLORIDE, CALCIUM CHLORIDE: 600; 310; 30; 20 INJECTION, SOLUTION INTRAVENOUS at 19:57

## 2022-09-17 RX ADMIN — METRONIDAZOLE 500 MG: 500 INJECTION, SOLUTION INTRAVENOUS at 14:32

## 2022-09-17 RX ADMIN — SODIUM CHLORIDE: 9 INJECTION, SOLUTION INTRAVENOUS at 14:33

## 2022-09-17 RX ADMIN — DEXAMETHASONE SODIUM PHOSPHATE 8 MG: 4 INJECTION, SOLUTION INTRA-ARTICULAR; INTRALESIONAL; INTRAMUSCULAR; INTRAVENOUS; SOFT TISSUE at 20:08

## 2022-09-17 RX ADMIN — ONDANSETRON 4 MG: 2 INJECTION INTRAMUSCULAR; INTRAVENOUS at 13:11

## 2022-09-17 RX ADMIN — PROPOFOL 150 MG: 10 INJECTION, EMULSION INTRAVENOUS at 20:04

## 2022-09-17 RX ADMIN — FENTANYL CITRATE 100 MCG: 50 INJECTION, SOLUTION INTRAMUSCULAR; INTRAVENOUS at 20:04

## 2022-09-17 RX ADMIN — HYDROMORPHONE HYDROCHLORIDE 1 MG: 1 INJECTION, SOLUTION INTRAMUSCULAR; INTRAVENOUS; SUBCUTANEOUS at 18:53

## 2022-09-17 RX ADMIN — ONDANSETRON 4 MG: 2 INJECTION INTRAMUSCULAR; INTRAVENOUS at 20:28

## 2022-09-17 RX ADMIN — Medication 100 MG: at 20:04

## 2022-09-17 RX ADMIN — AMOXICILLIN AND CLAVULANATE POTASSIUM 1 TABLET: 875; 125 TABLET, FILM COATED ORAL at 22:06

## 2022-09-17 RX ADMIN — LIDOCAINE HYDROCHLORIDE 40 MG: 20 INJECTION, SOLUTION EPIDURAL; INFILTRATION; INTRACAUDAL at 20:04

## 2022-09-17 RX ADMIN — Medication 50 MG: at 20:28

## 2022-09-17 RX ADMIN — KETOROLAC TROMETHAMINE 30 MG: 30 INJECTION, SOLUTION INTRAMUSCULAR at 20:28

## 2022-09-17 RX ADMIN — SUGAMMADEX 200 MG: 100 INJECTION, SOLUTION INTRAVENOUS at 20:28

## 2022-09-17 RX ADMIN — CEFOTETAN DISODIUM 2 G: 2 INJECTION, POWDER, FOR SOLUTION INTRAMUSCULAR; INTRAVENOUS at 20:14

## 2022-09-17 RX ADMIN — IOHEXOL 100 ML: 350 INJECTION, SOLUTION INTRAVENOUS at 13:27

## 2022-09-17 RX ADMIN — CEFTRIAXONE SODIUM 2 G: 2 INJECTION, POWDER, FOR SOLUTION INTRAMUSCULAR; INTRAVENOUS at 14:32

## 2022-09-17 ASSESSMENT — LIFESTYLE VARIABLES
HAVE YOU EVER FELT YOU SHOULD CUT DOWN ON YOUR DRINKING: NO
CONSUMPTION TOTAL: NEGATIVE
TOTAL SCORE: 0
AVERAGE NUMBER OF DAYS PER WEEK YOU HAVE A DRINK CONTAINING ALCOHOL: 1
EVER HAD A DRINK FIRST THING IN THE MORNING TO STEADY YOUR NERVES TO GET RID OF A HANGOVER: NO
TOTAL SCORE: 0
HOW MANY TIMES IN THE PAST YEAR HAVE YOU HAD 5 OR MORE DRINKS IN A DAY: 0
HAVE PEOPLE ANNOYED YOU BY CRITICIZING YOUR DRINKING: NO
EVER FELT BAD OR GUILTY ABOUT YOUR DRINKING: NO
ON A TYPICAL DAY WHEN YOU DRINK ALCOHOL HOW MANY DRINKS DO YOU HAVE: 1
DO YOU DRINK ALCOHOL: NO
TOTAL SCORE: 0

## 2022-09-17 ASSESSMENT — PAIN DESCRIPTION - PAIN TYPE
TYPE: ACUTE PAIN
TYPE: ACUTE PAIN

## 2022-09-17 NOTE — ED PROVIDER NOTES
ED Provider Note  CHIEF COMPLAINT  Chief Complaint   Patient presents with    Flank Pain     Rt sided Onset last night  Hx renal calculi and diverticulitis This pain similar but not as intense    Abdominal Pain     RLQ Radiates from RT flank and has mid abd cramps  No N/V/D/Fever       HPI  Stew Ramos is a 67 y.o. male who presents with right lower quadrant/right flank pain.  Onset was last night.  Pain was gradual and has increased in intensity since.  History of diverticulitis as well as kidney stone.  Patient is currently fasting for personal reasons.  Denies any nausea vomiting or diarrhea.  No fevers.  No testicular pain.  Pain radiates around to his back.  Nothing makes it better or worse.  History of surgery for diverticulitis many years ago.  Denies any distention.  No cough or chest pain.  No history of diabetes.  No hematuria or dysuria.    REVIEW OF SYSTEMS  See HPI for further details. All other systems are negative.     PAST MEDICAL HISTORY  Past Medical History:   Diagnosis Date    Asthma     Diverticulitis     Renal disorder     renal calculi       FAMILY HISTORY  [unfilled]    SOCIAL HISTORY  Social History     Socioeconomic History    Marital status:    Tobacco Use    Smoking status: Former     Packs/day: 1.00     Years: 20.00     Pack years: 20.00     Types: Cigarettes     Quit date: 1988     Years since quittin.7    Smokeless tobacco: Never    Tobacco comments:     Years unknown-please verify   Vaping Use    Vaping Use: Never used   Substance and Sexual Activity    Alcohol use: Yes     Alcohol/week: 0.6 - 1.8 oz     Types: 1 - 3 Standard drinks or equivalent per week     Comment: Hard liquor, once a week     Drug use: Yes     Types: Inhaled     Comment: THC       SURGICAL HISTORY  Past Surgical History:   Procedure Laterality Date    HERNIA REPAIR      OTHER ABDOMINAL SURGERY      Bowel resection       CURRENT MEDICATIONS   Home Medications    **Home medications have not yet been  "reviewed for this encounter**         ALLERGIES  No Known Allergies    PHYSICAL EXAM  VITAL SIGNS: /76   Pulse 83   Temp 36.8 °C (98.2 °F) (Temporal)   Resp 14   Ht 1.753 m (5' 9\")   Wt 83.9 kg (184 lb 15.5 oz)   SpO2 98%   BMI 27.31 kg/m²       Constitutional:  Well developed, No acute distress, Non-toxic appearance.   HENT: Normocephalic, Atraumatic, Bilateral external ears normal,  Nose normal.   Eyes: PERRL, EOMI, Conjunctiva normal  Neck: Normal range of motion, No tenderness, Supple  Cardiovascular: Normal heart rate, Normal rhythm  Thorax & Lungs: Normal breath sounds, No respiratory distress,  Abdomen: Right lower quadrant abdominal tenderness, no pulsatile mass, no rebound or guarding, positive bowel sounds although somewhat diminished, no distention  Back: No tenderness, No CVA tenderness.   Extremities: Intact distal pulses, No edema, No tenderness   Neurologic: Alert & oriented x 3, Normal motor function, Normal sensory function, No focal deficits noted.   Psychiatric: appropriate      Labs  Results for orders placed or performed during the hospital encounter of 09/17/22   CBC WITH DIFFERENTIAL   Result Value Ref Range    WBC 13.1 (H) 4.8 - 10.8 K/uL    RBC 5.41 4.70 - 6.10 M/uL    Hemoglobin 17.2 14.0 - 18.0 g/dL    Hematocrit 50.8 42.0 - 52.0 %    MCV 93.9 81.4 - 97.8 fL    MCH 31.8 27.0 - 33.0 pg    MCHC 33.9 33.7 - 35.3 g/dL    RDW 44.9 35.9 - 50.0 fL    Platelet Count 234 164 - 446 K/uL    MPV 10.3 9.0 - 12.9 fL    Neutrophils-Polys 89.90 (H) 44.00 - 72.00 %    Lymphocytes 5.10 (L) 22.00 - 41.00 %    Monocytes 4.10 0.00 - 13.40 %    Eosinophils 0.20 0.00 - 6.90 %    Basophils 0.30 0.00 - 1.80 %    Immature Granulocytes 0.40 0.00 - 0.90 %    Nucleated RBC 0.00 /100 WBC    Neutrophils (Absolute) 11.73 (H) 1.82 - 7.42 K/uL    Lymphs (Absolute) 0.67 (L) 1.00 - 4.80 K/uL    Monos (Absolute) 0.54 0.00 - 0.85 K/uL    Eos (Absolute) 0.03 0.00 - 0.51 K/uL    Baso (Absolute) 0.04 0.00 - 0.12 K/uL "    Immature Granulocytes (abs) 0.05 0.00 - 0.11 K/uL    NRBC (Absolute) 0.00 K/uL   COMP METABOLIC PANEL   Result Value Ref Range    Sodium 133 (L) 135 - 145 mmol/L    Potassium 4.4 3.6 - 5.5 mmol/L    Chloride 99 96 - 112 mmol/L    Co2 20 20 - 33 mmol/L    Anion Gap 14.0 7.0 - 16.0    Glucose 80 65 - 99 mg/dL    Bun 13 8 - 22 mg/dL    Creatinine 0.95 0.50 - 1.40 mg/dL    Calcium 8.8 8.4 - 10.2 mg/dL    AST(SGOT) 22 12 - 45 U/L    ALT(SGPT) 20 2 - 50 U/L    Alkaline Phosphatase 112 (H) 30 - 99 U/L    Total Bilirubin 1.7 (H) 0.1 - 1.5 mg/dL    Albumin 4.4 3.2 - 4.9 g/dL    Total Protein 7.0 6.0 - 8.2 g/dL    Globulin 2.6 1.9 - 3.5 g/dL    A-G Ratio 1.7 g/dL   LIPASE   Result Value Ref Range    Lipase 22 7 - 58 U/L   URINALYSIS    Specimen: Urine, Clean Catch   Result Value Ref Range    Color Straw     Character Clear     Specific Gravity 1.010 <1.035    Ph 5.0 5.0 - 8.0    Glucose Negative Negative mg/dL    Ketones >=80 (A) Negative mg/dL    Protein Negative Negative mg/dL    Bilirubin Negative Negative    Nitrite Negative Negative    Leukocyte Esterase Negative Negative    Occult Blood Trace (A) Negative    Micro Urine Req Microscopic    ESTIMATED GFR   Result Value Ref Range    GFR (CKD-EPI) 88 >60 mL/min/1.73 m 2   URINE MICROSCOPIC (W/UA)   Result Value Ref Range    RBC Rare /hpf    Epithelial Cells Rare Few /hpf    Mucous Threads Rare /hpf       RADIOLOGY/PROCEDURES  CT-ABDOMEN-PELVIS WITH   Final Result      1.  Findings indicate appendicitis. Enlarged appendix with appendicolith and surrounding inflammation is identified.      2.  Mild induration noted in the peritoneal fat which could indicate peritonitis. No free fluid or free air is identified.          EKG was done preop.  It was read by abel Sam    Rate of 89, normal sinus rhythm, no ST or T wave changes, no PVCs, normal intervals, no acute MI    COURSE & MEDICAL DECISION MAKING  Pertinent Labs & Imaging studies reviewed. (See chart for  details)  Patient presents to the emergency department with right lower quadrant abdominal pain.  No peritoneal signs.  Differential includes possible UTI versus kidney stone versus appendicitis.    Patient CT scan shows evidence of an acute appendicitis.  Urine does not show evidence of infection.  Patient was given Rocephin and Flagyl.  Patient has been treated for pain.  IV fluids are being given.    Discussed with Dr. Zee of surgery who will come and see the patient.    Patient is hospitalized in guarded condition.    FINAL IMPRESSION     1. Acute appendicitis with localized peritonitis, without perforation, abscess, or gangrene             Electronically signed by: Flora Sam M.D., 9/17/2022 12:43 PM

## 2022-09-17 NOTE — ED NOTES
Med rec updated and complete  Allergies reviewed  Interviewed pt with wife at bedside with permission from pt  Pt reports no prescription medications.  Pt reports no antibiotics in the last 30 days.

## 2022-09-18 VITALS
DIASTOLIC BLOOD PRESSURE: 68 MMHG | SYSTOLIC BLOOD PRESSURE: 98 MMHG | HEART RATE: 92 BPM | TEMPERATURE: 98.1 F | RESPIRATION RATE: 20 BRPM | OXYGEN SATURATION: 93 % | WEIGHT: 184.97 LBS | BODY MASS INDEX: 27.4 KG/M2 | HEIGHT: 69 IN

## 2022-09-18 PROCEDURE — 700102 HCHG RX REV CODE 250 W/ 637 OVERRIDE(OP): Performed by: SURGERY

## 2022-09-18 PROCEDURE — G0378 HOSPITAL OBSERVATION PER HR: HCPCS

## 2022-09-18 PROCEDURE — 700111 HCHG RX REV CODE 636 W/ 250 OVERRIDE (IP): Performed by: SURGERY

## 2022-09-18 PROCEDURE — A9270 NON-COVERED ITEM OR SERVICE: HCPCS | Performed by: SURGERY

## 2022-09-18 PROCEDURE — 94760 N-INVAS EAR/PLS OXIMETRY 1: CPT

## 2022-09-18 PROCEDURE — 96375 TX/PRO/DX INJ NEW DRUG ADDON: CPT

## 2022-09-18 RX ORDER — OXYCODONE HYDROCHLORIDE AND ACETAMINOPHEN 5; 325 MG/1; MG/1
1 TABLET ORAL EVERY 4 HOURS PRN
Qty: 20 TABLET | Refills: 0 | Status: SHIPPED | OUTPATIENT
Start: 2022-09-18 | End: 2022-09-25

## 2022-09-18 RX ORDER — AMOXICILLIN AND CLAVULANATE POTASSIUM 875; 125 MG/1; MG/1
1 TABLET, FILM COATED ORAL 2 TIMES DAILY
Qty: 10 TABLET | Refills: 0 | Status: SHIPPED | OUTPATIENT
Start: 2022-09-18

## 2022-09-18 RX ADMIN — AMOXICILLIN AND CLAVULANATE POTASSIUM 1 TABLET: 875; 125 TABLET, FILM COATED ORAL at 06:25

## 2022-09-18 RX ADMIN — KETOROLAC TROMETHAMINE 15 MG: 30 INJECTION, SOLUTION INTRAMUSCULAR; INTRAVENOUS at 08:58

## 2022-09-18 ASSESSMENT — PAIN DESCRIPTION - PAIN TYPE: TYPE: ACUTE PAIN

## 2022-09-18 NOTE — H&P
ATSP by Dr Sam for Appendicitis    HPI: 67y M here with RLQ abdominal pain which started last night and has been continued and severe and non-stop since.  He also describes some nausea and emesis along with the pain.  Pain is unlike anythign he has had before.  He does have a hx of surgery for diverticulitis about 10 years ago.      PMHx: Asthma, Diverticulitis, Renal Calculi    PSHx: Laparoscopic LAR; hernia Repair    Meds: see Med Rec, no anticoagulation    NKDA    FamHx: no colon/rectal cancers, no other pertinent family history    SocHx: No Tob/Drugs, occasional EtOH      ROS: negative except as above    Consitutional- above  HEENT- no visual changes, no sneezing or runny nose  Skin- no rashes or itching  Cardiovascular- no chest pain or palpatations  Respiratory- no SOB or cough  GI- above  - no dysurea  Neuro- no weakness or syncope  Musculoskeletal- no muscle or joint pain  Heme- no bleeding or bruising  Lymphatic- no enlarged nodes or previous splenectomy  Endocrine- No sweating or heat/cold intolerance  Allergy- No asthma or hives  Psychiatric- no depression or anxiety        Physical Exam:   AFVSS  A@O x3, NAD  NCAT, no scleral icterus  Neck nontender, no lymphadenopathy  Normal respiratory effort, no chest wall masses  RRR, 2+ pulses  Abdomen soft, no peritonitis, no masses, tender to palpation in RLQ at McBurney's point  Extremities warm and well perfused  No skin rashes or lesions    Labs: WBC 13.1, Hct 51, Plt 234, Tor 90  Na 133, otherwise lytes wnl    Radiology: CT A/P:    1.  Findings indicate appendicitis. Enlarged appendix with appendicolith and surrounding inflammation is identified.     2.  Mild induration noted in the peritoneal fat which could indicate peritonitis. No free fluid or free air is identified.      A/P: 67y M with Appendicitis.  Risks, benefits,alteratives of surgery explained to him.  He is now NPO and consents to the planned surgery.  To OR for Lap Appy this evening.

## 2022-09-18 NOTE — PROGRESS NOTES
Received report from CHARLOTTE Wallace. Pt is alert and oriented, s/p lap appy. No pain at this time, walking ad semaj. Safety measures in place, care assumed.

## 2022-09-18 NOTE — ANESTHESIA PROCEDURE NOTES
Airway    Date/Time: 9/17/2022 8:04 PM  Performed by: Jodi Guevara M.D.  Authorized by: Jodi Guevara M.D.     Location:  OR  Urgency:  Elective  Indications for Airway Management:  Anesthesia      Spontaneous Ventilation: absent    Sedation Level:  Deep  Preoxygenated: Yes    Patient Position:  Sniffing  Mask Difficulty Assessment:  0 - not attempted  Final Airway Type:  Endotracheal airway  Final Endotracheal Airway:  ETT  Cuffed: Yes    Technique Used for Successful ETT Placement:  Direct laryngoscopy  Devices/Methods Used in Placement:  Cricoid pressure    Insertion Site:  Oral  Blade Type:  Laureano  Laryngoscope Blade/Videolaryngoscope Blade Size:  3  ETT Size (mm):  7.5  Measured from:  Teeth  ETT to Teeth (cm):  23  Placement Verified by: auscultation and capnometry    Cormack-Lehane Classification:  Grade IIb - view of arytenoids or posterior of glottis only  Number of Attempts at Approach:  1

## 2022-09-18 NOTE — ANESTHESIA PREPROCEDURE EVALUATION
Case: [id number] Date/Time: 09/17/22 1945    Procedure: APPENDECTOMY, LAPAROSCOPIC    Location: [facility] OR 01 / SURGERY [facility]    Surgeons: Siva Zee M.D.          Relevant Problems   PULMONARY   (positive) Moderate persistent asthma without complication      GI   (positive) GERD (gastroesophageal reflux disease)       Physical Exam    Airway   Mallampati: II  TM distance: >3 FB  Neck ROM: full       Cardiovascular - normal exam  Rhythm: regular  Rate: normal  (-) murmur     Dental - normal exam           Pulmonary - normal exam  Breath sounds clear to auscultation     Abdominal    Neurological - normal exam                 Anesthesia Plan    ASA 2       Plan - general       Airway plan will be ETT    (Asthma)    Plan Factors:   Patient was previously instructed to abstain from smoking on day of procedure.  Patient did not smoke on day of procedure.      Induction: intravenous and rapid sequence    Postoperative Plan: Postoperative administration of opioids is intended.    Pertinent diagnostic labs and testing reviewed    Informed Consent:    Anesthetic plan and risks discussed with patient.    Use of blood products discussed with: patient whom consented to blood products.

## 2022-09-18 NOTE — DISCHARGE INSTRUCTIONS
Diet as tolerated  May shower daily with wounds uncovered  No heavy lifting or straining for 4 weeks after surgery      Discharge Instructions    Laparoscopic Appendectomy, Adult, Care After  This sheet gives you information about how to care for yourself after your procedure. Your doctor may also give you more specific instructions. If you have problems or questions, contact your doctor.  What can I expect after the procedure?  After the procedure, it is common to have:  Little energy for normal activities.  Mild pain in the area where the cuts from surgery (incisions) were made.  Trouble pooping (constipation). This can be caused by:  Pain medicine.  A lack of activity.  Follow these instructions at home:  Medicines  Take over-the-counter and prescription medicines only as told by your doctor.  If you were prescribed an antibiotic medicine, take it as told by your doctor. Do not stop taking it even if you start to feel better.  Do not drive or use heavy machinery while taking prescription pain medicine.  Ask your doctor if the medicine you are taking can cause trouble pooping. You may need to take steps to prevent or treat trouble pooping:  Drink enough fluid to keep your pee (urine) pale yellow.  Take over-the-counter or prescription medicines.  Eat foods that are high in fiber. These include beans, whole grains, and fresh fruits and vegetables.  Limit foods that are high in fat and sugar. These include fried or sweet foods.  Incision care    Follow instructions from your doctor about how to take care of your cuts from surgery. Make sure you:  Wash your hands with soap and water before and after you change your bandage (dressing). If you cannot use soap and water, use hand .  Change your bandage as told by your doctor.  Leave stitches (sutures), skin glue, or skin tape (adhesive) strips in place. They may need to stay in place for 2 weeks or longer. If tape strips get loose and curl up, you may trim the  loose edges. Do not remove tape strips completely unless your doctor says it is okay.  Check your cuts from surgery every day for signs of infection. Check for:  Redness, swelling, or pain.  Fluid or blood.  Warmth.  Pus or a bad smell.  Bathing  Keep your cuts from surgery clean and dry. Clean them as told by your doctor. To do this:  Gently wash the cuts with soap and water.  Rinse the cuts with water to remove all soap.  Pat the cuts dry with a clean towel. Do not rub the cuts.  Do not take baths, swim, or use a hot tub for 2 weeks, or until your doctor says it is okay. You may take showers after 48 hours.  Activity    Do not drive for 24 hours if you were given a medicine to help you relax (sedative) during your procedure.  Rest after the procedure. Return to your normal activities as told by your doctor. Ask your doctor what activities are safe for you.  For 3 weeks, or for as long as told by your doctor:  Do not lift anything that is heavier than 10 lb (4.5 kg), or the limit that you are told.  Do not play contact sports.  General instructions  If you were sent home with a drain, follow instructions from your doctor on how to care for it.  Take deep breaths. This helps to keep your lungs from getting an infection (pneumonia).  Keep all follow-up visits as told by your doctor. This is important.  Contact a doctor if:  You have redness, swelling, or pain around a cut from surgery.  You have fluid or blood coming from a cut.  Your cut feels warm to the touch.  You have pus or a bad smell coming from a cut or a bandage.  The edges of a cut break open after the stitches have been taken out.  You have pain in your shoulders that gets worse.  You feel dizzy or you pass out (faint).  You have shortness of breath.  You keep feeling sick to your stomach (nauseous).  You keep throwing up (vomiting).  You get watery poop (diarrhea) or you cannot control your poop.  You lose your appetite.  You have swelling or pain in  your legs.  You get a rash.  Get help right away if:  You have a fever.  You have trouble breathing.  You have sharp pains in your chest.  Summary  After the procedure, it is common to have low energy, mild pain, and trouble pooping.  Infection is a common problem after this procedure. Follow your doctor's instructions about caring for yourself after the procedure.  Rest after the procedure. Return to your normal activities as told by your doctor.  Contact your doctor if you see signs of infection around your cuts from surgery, or you get short of breath. Get help right away if you have a fever, chest pain, or trouble breathing.  This information is not intended to replace advice given to you by your health care provider. Make sure you discuss any questions you have with your health care provider.  Document Released: 10/14/2010 Document Revised: 06/20/2019 Document Reviewed: 06/20/2019  [email or web address] Patient Education © 2020 [email or web address]      Discharged to home by car with relative. Discharged via wheelchair, hospital escort: Yes.  Special equipment needed: Not Applicable    Be sure to schedule a follow-up appointment with your primary care doctor or any specialists as instructed.     Discharge Plan:   Diet Plan: Discussed  Activity Level: Discussed  Confirmed Follow up Appointment: Patient to Call and Schedule Appointment  Confirmed Symptoms Management: Discussed  Medication Reconciliation Updated: Yes    I understand that a diet low in cholesterol, fat, and sodium is recommended for good health. Unless I have been given specific instructions below for another diet, I accept this instruction as my diet prescription.   Other diet: regular, as tolerated    Special Instructions: None    -Is this patient being discharged with medication to prevent blood clots?  No    Is patient discharged on Warfarin / Coumadin?   No

## 2022-09-18 NOTE — OP REPORT
DATE OF SERVICE:  09/17/2022     PREOPERATIVE DIAGNOSIS:  Appendicitis.     POSTOPERATIVE DIAGNOSIS:  Appendicitis.     PROCEDURE:  Laparoscopic appendectomy.     SURGEON:  Siva Zee MD     ASSISTANT:  None.     ANESTHESIA:  General endotracheal anesthesia.     ESTIMATED BLOOD LOSS:  5 mL.     SPECIMENS:  Appendix.     COMPLICATIONS:  None.     CONDITION:  Stable.     INDICATIONS FOR PROCEDURE:  This is a 67-year-old male who presents with acute   onset right lower quadrant abdominal pain and tenderness at McBurney's point.    Risks, benefits and alternatives of laparoscopic appendectomy were explained   to him before proceeding.     OPERATIVE FINDINGS:  Inflamed appendix removed with hemostasis.     OPERATIVE TECHNIQUE:  After informed consent was obtained, the patient was   taken to the operating room and placed in supine position.  After adequate   endotracheal anesthesia was achieved, the abdomen was prepped and draped in   sterile fashion.  Operation was begun by placing a 5 mm periumbilical incision   through which 5 mm trocar was introduced into the abdomen using Optiview   technique.  After pneumoperitoneum was achieved, additional trocars were   placed 12 mm in the left lower quadrant and a 5 mm trocar in the suprapubic   midline.  All trocars were placed with 0.5% Marcaine with epinephrine for   local anesthesia.     The patient was positioned and we inspected the right lower quadrant.  We   noted an inflamed, suppurative appendicitis, we noted an inflamed, suppurative   appendicitis and with some purulent fluid around it.  We irrigated and all   contaminated fluid away.  We then divided the appendiceal mesentery with a   LigaSure device and the appendiceal base with a blue load AGUILAR-75 staple fire.    With this completed, the appendix was placed in an EndoCatch bag and removed   after dilation from the 12 mm trocar site.  We inspected the abdomen and noted   hemostasis.  We closed the trocar site  fascia with a 0 PDS using an EndoClose   device.  Pneumoperitoneum was reduced and all trocars were removed.    Incisions were closed with 4-0 Monocryl and Dermabond.  The patient was   returned to the PACU in stable condition.  All instrument counts were correct   at the end of the procedure.        ______________________________  MD TAMIKA Lee/CRISTINA    DD:  09/17/2022 20:36  DT:  09/17/2022 21:21    Job#:  [id number]

## 2022-09-18 NOTE — DISCHARGE SUMMARY
Discharge Summary      DATE OF ADMISSION: 9/17/2022    DATE OF DISCHARGE: 9/18/2022    ADMISSION DIAGNOSIS (ES):  Appendicitis    DISCHARGE DIAGNOSIS (ES):  same    DISCHARGE CONDITION:  stable    CONSULTATIONS:  none    PROCEDURES:  Laparoscopic Appendectomy    BRIEF HPI:  67y M was seen in the ED with acute onset abdominal pain, diagnosed with appendicitis and taken to the OR for further treatment.      HOSPITAL COURSE:  Post-operatively he recovered overnight and was able to ambulate, tolerate a regular diet, and had prompt return of bowel function.  He was discharged home the following morning in stable condition at which point his wounds were healing well.      MEDS:   Current Outpatient Medications   Medication Sig Dispense Refill    oxyCODONE-acetaminophen (PERCOCET) 5-325 MG Tab Take 1 Tablet by mouth every four hours as needed for Severe Pain or Moderate Pain for up to 7 days. 20 Tablet 0    amoxicillin-clavulanate (AUGMENTIN) 875-125 MG Tab Take 1 Tablet by mouth 2 times a day. 10 Tablet 0       FOLLOW-UP:  Please call my office at [phone] to make an appointment in 1 weeks    DISCHARGE INSTRUCTIONS:

## 2022-09-18 NOTE — OR NURSING
2037:  To PACU from OR via gurney,  sleeping, respirations spontaneous and non-labored. No c/o pain or nausea.    2052:  No change.  Pt tolerates sips of water and weaned to RA    2107:  No change.  Pt meets criteria to transfer to floor.  Report given.

## 2022-09-18 NOTE — ANESTHESIA POSTPROCEDURE EVALUATION
Patient: Stew Ramos    Procedure Summary     Date: 09/17/22 Room / Location: [place] [id number] / SURGERY [facility]    Anesthesia Start: 1957 Anesthesia Stop: 2040    Procedure: APPENDECTOMY, LAPAROSCOPIC (Abdomen) Diagnosis:     Surgeons: Siva Zee M.D. Responsible Provider: Jodi Guevara M.D.    Anesthesia Type: general ASA Status: 2          Final Anesthesia Type: general  Last vitals  BP   Blood Pressure : 120/70    Temp   36.8 °C (98.2 °F)    Pulse   81   Resp   16    SpO2   98 %      Anesthesia Post Evaluation    Patient location during evaluation: PACU  Patient participation: complete - patient participated  Level of consciousness: awake and alert    Airway patency: patent  Anesthetic complications: no  Cardiovascular status: hemodynamically stable  Respiratory status: acceptable  Hydration status: euvolemic    PONV: none          No notable events documented.     Nurse Pain Score: 8 (NPRS)

## 2022-09-18 NOTE — PROGRESS NOTES
Pt discharged to home. Discharge instructions provided to pt. Pt verbalizes understanding. Pt states all questions have been answered. Signed copy in chart. Prescriptions sent to pharmacy. Pt states that all personal belongings are in possession. Pt off unit via walking, escorted by nursing staff.

## 2022-09-18 NOTE — OR NURSING
NPO status verified, medication reconciliation done. All pre-op CHG protocol followed. Lack of allergies verified. Anesthesia and surgical consents signed and witnessed. All belongings accounted for.

## 2022-09-18 NOTE — CARE PLAN
The patient is Stable - Low risk of patient condition declining or worsening         Progress made toward(s) clinical / shift goals:  negative pain, no nausea and vomiting.Incision site, dry and intact.    Patient is not progressing towards the following goals:

## 2025-03-31 ENCOUNTER — RESEARCH ENCOUNTER (OUTPATIENT)
Dept: RESEARCH | Facility: MEDICAL CENTER | Age: 70
End: 2025-03-31

## 2025-03-31 DIAGNOSIS — Z00.6 RESEARCH STUDY PATIENT: Primary | ICD-10-CM

## 2025-03-31 DIAGNOSIS — Z00.6 CLINICAL TRIAL PARTICIPANT: ICD-10-CM

## (undated) DEVICE — SODIUM CHL IRRIGATION 0.9% 1000ML (12EA/CA)

## (undated) DEVICE — GLOVE BIOGEL SZ 7 SURGICAL PF LTX - (50PR/BX 4BX/CA)

## (undated) DEVICE — SUTURE 4-0 MONOCRYL PLUS PS-2 - 27 INCH (36/BX)

## (undated) DEVICE — GOWN WARMING STANDARD FLEX - (30/CA)

## (undated) DEVICE — STAPLER 45MM ARTICULATING - ENDO (3EA/BX)

## (undated) DEVICE — SET SUCTION/IRRIGATION WITH DISPOSABLE TIP (6/CA )PART #0250-070-520 IS A SUB

## (undated) DEVICE — TROCAR 5X100 NON BLADED Z-TH - READ KII (6/BX)

## (undated) DEVICE — SLEEVE, VASO, THIGH, MED

## (undated) DEVICE — SUTURE 0 PDS CT-2 (36PK/BX)

## (undated) DEVICE — SET EXTENSION WITH 2 PORTS (48EA/CA) ***PART #2C8610 IS A SUBSTITUTE*****

## (undated) DEVICE — CHLORAPREP 26 ML APPLICATOR - ORANGE TINT(25/CA)

## (undated) DEVICE — BAG RETRIEVAL 10ML (10EA/BX)

## (undated) DEVICE — SUTURE GENERAL

## (undated) DEVICE — TOWEL STOP TIMEOUT SAFETY FLAG (40EA/CA)

## (undated) DEVICE — DRAPESURG STERI-DRAPE LONG - (10/BX 4BX/CA)

## (undated) DEVICE — SENSOR OXIMETER ADULT SPO2 RD SET (20EA/BX)

## (undated) DEVICE — ELECTRODE DUAL RETURN W/ CORD - (50/PK)

## (undated) DEVICE — TUBING CLEARLINK DUO-VENT - C-FLO (48EA/CA)

## (undated) DEVICE — DERMABOND ADVANCED - (12EA/BX)

## (undated) DEVICE — GLOVE BIOGEL INDICATOR SZ 7SURGICAL PF LTX - (50/BX 4BX/CA)

## (undated) DEVICE — CANISTER SUCTION 3000ML MECHANICAL FILTER AUTO SHUTOFF MEDI-VAC NONSTERILE LF DISP  (40EA/CA)

## (undated) DEVICE — TROCAR Z THREAD12MM OPTICAL - NON BLADED (6/BX)

## (undated) DEVICE — STAPLE 45MM BLUE 4.5MM (12EA/BX)

## (undated) DEVICE — SET LEADWIRE 5 LEAD BEDSIDE DISPOSABLE ECG (1SET OF 5/EA)

## (undated) DEVICE — LIGASURE LAPAROSCOPIC 5MM - (6EA/CA)

## (undated) DEVICE — STAPLE 45MM VASCULAR WHITE 2.5MM (12EA/BX)

## (undated) DEVICE — CANNULA W/SEAL 5X100 Z-THRE - ADED KII (12/BX)

## (undated) DEVICE — SUCTION INSTRUMENT YANKAUER BULBOUS TIP W/O VENT (50EA/CA)

## (undated) DEVICE — Device

## (undated) DEVICE — LACTATED RINGERS INJ 1000 ML - (14EA/CA 60CA/PF)